# Patient Record
Sex: FEMALE | Race: WHITE | URBAN - METROPOLITAN AREA
[De-identification: names, ages, dates, MRNs, and addresses within clinical notes are randomized per-mention and may not be internally consistent; named-entity substitution may affect disease eponyms.]

---

## 2018-03-19 LAB
ABO + RH BLD: NORMAL
ABO + RH BLD: NORMAL
BLD GP AB SCN SERPL QL: NEGATIVE
C TRACH DNA SPEC QL PROBE+SIG AMP: NEGATIVE
CULTURE MICRO: NO GROWTH
HBV SURFACE AG SERPL QL IA: NEGATIVE
HCT VFR BLD AUTO: 40.3 %
HEMOGLOBIN: 13.6 G/DL (ref 11.7–15.7)
HIV 1+2 AB+HIV1 P24 AG SERPL QL IA: NORMAL
N GONORRHOEA DNA SPEC QL PROBE+SIG AMP: NEGATIVE
PLATELET # BLD AUTO: 271 10^9/L
RUBELLA ANTIBODY IGG QUANTITATIVE: NORMAL IU/ML
T PALLIDUM IGG SER QL: NORMAL

## 2018-04-17 LAB — CULTURE MICRO: NO GROWTH

## 2018-07-11 ENCOUNTER — PRENATAL OFFICE VISIT (OUTPATIENT)
Dept: OBGYN | Facility: CLINIC | Age: 28
End: 2018-07-11
Payer: COMMERCIAL

## 2018-07-11 VITALS
BODY MASS INDEX: 22.22 KG/M2 | HEIGHT: 69 IN | HEART RATE: 103 BPM | DIASTOLIC BLOOD PRESSURE: 85 MMHG | WEIGHT: 150 LBS | SYSTOLIC BLOOD PRESSURE: 125 MMHG

## 2018-07-11 DIAGNOSIS — K59.00 CONSTIPATION, UNSPECIFIED CONSTIPATION TYPE: ICD-10-CM

## 2018-07-11 DIAGNOSIS — O28.3 ABNORMAL ULTRASONIC FINDING ON ANTENATAL SCREENING OF MOTHER: ICD-10-CM

## 2018-07-11 DIAGNOSIS — Z34.02 ENCOUNTER FOR SUPERVISION OF NORMAL FIRST PREGNANCY IN SECOND TRIMESTER: Primary | ICD-10-CM

## 2018-07-11 PROBLEM — O23.40 URINARY TRACT INFECTION IN PREGNANCY, ANTEPARTUM: Status: ACTIVE | Noted: 2018-07-11

## 2018-07-11 LAB
GLUCOSE 1H P 50 G GLC PO SERPL-MCNC: 130 MG/DL (ref 60–129)
HGB BLD-MCNC: 11.2 G/DL (ref 11.7–15.7)

## 2018-07-11 PROCEDURE — 00000218 ZZHCL STATISTIC OBHBG - HEMOGLOBIN: Performed by: OBSTETRICS & GYNECOLOGY

## 2018-07-11 PROCEDURE — 99207 ZZC FIRST OB VISIT: CPT | Performed by: OBSTETRICS & GYNECOLOGY

## 2018-07-11 PROCEDURE — 36415 COLL VENOUS BLD VENIPUNCTURE: CPT | Performed by: OBSTETRICS & GYNECOLOGY

## 2018-07-11 PROCEDURE — 82950 GLUCOSE TEST: CPT | Performed by: OBSTETRICS & GYNECOLOGY

## 2018-07-11 NOTE — MR AVS SNAPSHOT
After Visit Summary   2018    Barb Levy    MRN: [id number]           Patient Information     Date Of Birth          1990        Visit Information        Provider Department      2018 3:30 PM Amee Becerra MD [facility]        Today's Diagnoses     Encounter for supervision of normal first pregnancy in second trimester    -  1    Abnormal ultrasonic finding on  screening of mother           Follow-ups after your visit        Additional Services     [facility] REFERRAL-PREGNANCY       There is no height or weight on file to calculate BMI.    >> Patient may proceed with recommendations for further testing as directed by the Maternal Fetal Medicine Specialist >>    >> If requesting Fetal Echo: MFM will determine appropriate location for exam due to indication.    >> If requesting Lung Maturity Amnio:  If results indicate fetal lung maturity, induction or C/S is recommended within 36 hours.  Please schedule accordingly.     Dear Patient:   Please be aware that coverage of these services is subject to the terms and limitations of your health insurance plan.  Call member services at your health plan with any benefit or coverage questions.      Please bring the following to your appointment:    >>  Any x-rays, CTs or MRIs which have been performed.  Contact the facility where they were done to arrange for  prior to your scheduled appointment.  Any new CT, MRI or other procedures ordered by your specialist must be performed at a [place] facility or coordinated by your clinic's referral office.  >>  List of current medications   >>  This referral request   >>  Any documents/labs given to you for this referral                  Who to contact     If you have questions or need follow up information about today's clinic visit or your schedule please contact [facility] directly at [phone].  Normal or non-critical lab and  "imaging results will be communicated to you by MyChart, letter or phone within 4 business days after the clinic has received the results. If you do not hear from us within 7 days, please contact the clinic through NPRt or phone. If you have a critical or abnormal lab result, we will notify you by phone as soon as possible.  Submit refill requests through Telensius or call your pharmacy and they will forward the refill request to us. Please allow 3 business days for your refill to be completed.          Additional Information About Your Visit        ZeusharMarketPage Information     Telensius lets you send messages to your doctor, view your test results, renew your prescriptions, schedule appointments and more. To sign up, go to [email or web address] . Click on \"Log in\" on the left side of the screen, which will take you to the Welcome page. Then click on \"Sign up Now\" on the right side of the page.     You will be asked to enter the access code listed below, as well as some personal information. Please follow the directions to create your username and password.     Your access code is: [email or web address]  Expires: 10/9/2018  3:53 PM     Your access code will  in 90 days. If you need help or a new code, please call your [place] clinic or [phone].        Care EveryWhere ID     This is your Care EveryWhere ID. This could be used by other organizations to access your [place] medical records  [id number]        Your Vitals Were     Pulse Height Last Period BMI (Body Mass Index)          103 5' 8.5\" (1.74 m) 2018 22.48 kg/m2         Blood Pressure from Last 3 Encounters:   18 125/85    Weight from Last 3 Encounters:   18 150 lb (68 kg)              We Performed the Following     ABO and Rh     Anti Treponema     CBC with platelets     Chlamydia trachomatis PCR     Glucose tolerance gest screen 1 hour     Hepatitis B surface antigen     HIV Antigen Antibody Combo     MAT FETAL MED CTR REFERRAL-PREGNANCY  "    Neisseria gonorrhoeae PCR     OB hemoglobin     OB hemoglobin     Rubella Antibody IgG Quantitative     Urine Culture Aerobic Bacterial     Urine Culture Aerobic Bacterial        Primary Care Provider Fax #    Physician No Ref-Primary [phone]       No address on file        Equal Access to Services     DEVONTE WHITTINGTON : Hadii boby starkey juliana Andino, warovertoda luqadaha, qaybta kaalmada arline, toño holden laShaeamon barba. So [facility] [phone].    ATENCIÓN: Si habla español, tiene a way disposición servicios gratuitos de asistencia lingüística. Llame al [phone].    We comply with applicable federal civil rights laws and [place] laws. We do not discriminate on the basis of race, color, national origin, age, disability, sex, sexual orientation, or gender identity.            Thank you!     Thank you for choosing [facility]  for your care. Our goal is always to provide you with excellent care. Hearing back from our patients is one way we can continue to improve our services. Please take a few minutes to complete the written survey that you may receive in the mail after your visit with us. Thank you!             Your Updated Medication List - Protect others around you: Learn how to safely use, store and throw away your medicines at www.disposemymeds.org.      Notice  As of 7/11/2018  3:53 PM    You have not been prescribed any medications.

## 2018-07-11 NOTE — Clinical Note
Please abstract the following data from this visit with this patient into the appropriate field in Epic:  Pap smear done on this date: 1/2016 (approximately), by this group: unk, results were NIL.

## 2018-07-11 NOTE — PROGRESS NOTES
OB - New OB History and Physical  Date of visit: 2018  Chief Complaint: To establish prenatal care    HPI: Barb Levy is a 27 year old  at 24w3d as dated by LMP c/w 9wUS.   Estimated Date of Delivery: Oct 28, 2018.    Transfer of care from [place] due to change in insurance coverage.  They live in [facility]    This was a planned pregnancy.  Having a baby girl.  She and  Rafael are very excited.    Ultrasound: Initial survey showed BPD and HC lagging, with growth at 10%ile.  Had f/u US with MFM that showed HC 4%ile and EFW 10%ile.  Recommended 4w f/u.    Obstetric history:     Obstetric History       T0      L0     SAB0   TAB0   Ectopic0   Multiple0   Live Births0       # Outcome Date GA Lbr Kenny/2nd Weight Sex Delivery Anes PTL Lv   1 Current                   Gynecologic History:   Menstrual Interval: 28-30 days  Patient's last menstrual period was 2018.   STI history: denies  Last Pap: 2016  History of abnormal pap: denies    Allergy: Review of patient's allergies indicates not on file.  Patient denies food, latex or environmental allergies.     Current Medications:  No current outpatient prescriptions on file.     No current facility-administered medications for this visit.        Past Medical History:  History reviewed. No pertinent past medical history.    Past Surgical History:  Past Surgical History:   Procedure Laterality Date     HC TOOTH EXTRACTION W/FORCEP      wisdom teeth       Social History:  Patient lives with  Rafael.  Patient's relationship status is: .    Denies current tobacco, alcohol or recreational drug use.    She feels safe in her relationship. Patient denies history of sexual, physical or mental abuse.     Family History:  Family History   Problem Relation Age of Onset     Melanoma Father      Colon Cancer Maternal Grandfather      HEART DISEASE Maternal Grandfather      Osteoperosis Paternal Grandmother      Bladder  "Cancer Paternal Grandfather        Review of Systems  Gen: no concerning change in weight, no fever, no chills, no fatigue  CV: no palpitations, no chest pain, no hypertension, no syncope  Resp: no shortness of breath, no cough, no wheezing, no asthma  GI: no nausea, no vomiting, no diarrhea, no constipation, no bloating, no GERD  :  no vaginal discharge, no dysuria, no abnormal bleeding, no pelvic pain   Endo: no thyroid problems, no cold/heat intolerance, no acne, no hirsutism, no diabetes  Heme: no easy bruising or bleeding, no history of DVT/PE/CVA  Neuro: no headaches, no seizures, no strokes, no focal deficits      Physical Exam:  Vitals:    18 1529   BP: 125/85   Pulse: 103   Weight: 150 lb (68 kg)   Height: 5' 8.5\" (1.74 m)     Body mass index is 22.48 kg/(m^2).  Gen: alert, oriented, no distress,  pleasant, appears stated age, appropriately groomed  Abd: soft, nontender, nondistended, normoactive bowel sounds,  no masses or organomegaly, no hernias, no scars  Extr: warm, well perfused, nontender, no edema  Psych: affect bright, cooperative, responds appropriately      Assessment:  Barb Levy is a 27 year old  at 24w3d presenting to establish prenatal care.    Problem List:   Lagging HC, growth  Constipation      Plan:  1. Lagging HC, growth: recommend repeat US with MFM order placed.  2. Constipation:  Recommended Colace BID and Miralax daily prn  3. Reviewed routine prenatal care. Discussed MD call schedule as well as role of residents and med students both in clinic and hospital.  She is okay  with resident care  4. Pap: up to date   5. Diet, Nutrition and Exercise:  Continue PNVs. Continue normal exercise. Her prepregnancy BMI is 20.  According to the WHO guidelines, patient is given a goal of gaining approximately 25-35 pounds during the course of her pregnancy.    6. Immunizations: plan TdaP at 28 weeks  7. Fetal anomaly screening: n/a  8. Routine Prenatal Care: the patient will " return to clinic in 4 weeks and prn    Amee Becerra MD

## 2018-07-13 ENCOUNTER — TRANSFERRED RECORDS (OUTPATIENT)
Dept: HEALTH INFORMATION MANAGEMENT | Facility: CLINIC | Age: 28
End: 2018-07-13

## 2018-07-16 ENCOUNTER — PRE VISIT (OUTPATIENT)
Dept: MATERNAL FETAL MEDICINE | Facility: CLINIC | Age: 28
End: 2018-07-16

## 2018-07-16 DIAGNOSIS — Z34.02 ENCOUNTER FOR SUPERVISION OF NORMAL FIRST PREGNANCY IN SECOND TRIMESTER: ICD-10-CM

## 2018-07-16 LAB
GLUCOSE 1H P 100 G GLC PO SERPL-MCNC: 104 MG/DL (ref 60–179)
GLUCOSE 2H P 100 G GLC PO SERPL-MCNC: 80 MG/DL (ref 60–154)
GLUCOSE 3H P 100 G GLC PO SERPL-MCNC: 48 MG/DL (ref 60–139)
GLUCOSE P FAST SERPL-MCNC: 71 MG/DL (ref 60–94)

## 2018-07-16 PROCEDURE — 82951 GLUCOSE TOLERANCE TEST (GTT): CPT | Performed by: OBSTETRICS & GYNECOLOGY

## 2018-07-16 PROCEDURE — 36415 COLL VENOUS BLD VENIPUNCTURE: CPT | Performed by: OBSTETRICS & GYNECOLOGY

## 2018-07-16 PROCEDURE — 82952 GTT-ADDED SAMPLES: CPT | Performed by: OBSTETRICS & GYNECOLOGY

## 2018-07-23 ENCOUNTER — HOSPITAL ENCOUNTER (OUTPATIENT)
Dept: ULTRASOUND IMAGING | Facility: CLINIC | Age: 28
Discharge: HOME | End: 2018-07-23
Attending: OBSTETRICS & GYNECOLOGY | Admitting: OBSTETRICS & GYNECOLOGY
Payer: COMMERCIAL

## 2018-07-23 ENCOUNTER — OFFICE VISIT (OUTPATIENT)
Dept: MATERNAL FETAL MEDICINE | Facility: CLINIC | Age: 28
End: 2018-07-23
Attending: OBSTETRICS & GYNECOLOGY
Payer: COMMERCIAL

## 2018-07-23 DIAGNOSIS — O26.90 PREGNANCY RELATED CONDITION, UNSPECIFIED TRIMESTER: ICD-10-CM

## 2018-07-23 DIAGNOSIS — Z03.74 SUSPECTED PROBLEM WITH FETAL GROWTH NOT FOUND: Primary | ICD-10-CM

## 2018-07-23 PROCEDURE — 76811 OB US DETAILED SNGL FETUS: CPT

## 2018-07-23 NOTE — MR AVS SNAPSHOT
After Visit Summary   7/23/2018    Barb Levy    MRN: [id number]           Patient Information     Date Of Birth          1990        Visit Information        Provider Department      7/23/2018 11:30 AM Amee Hardwick MD [facility]        Today's Diagnoses     Suspected problem with fetal growth not found    -  1       Follow-ups after your visit        Your next 10 appointments already scheduled     Aug 07, 2018  3:45 PM CDT   ESTABLISHED PRENATAL with [facility]erra [facility]    [address] [place] [id number] [phone]              Who to contact     If you have questions or need follow up information about today's clinic visit or your schedule please contact [facility] [facility] directly at [phone].  Normal or non-critical lab and imaging results will be communicated to you by Nabi Biopharmaceuticalshart, letter or phone within 4 business days after the clinic has received the results. If you do not hear from us within 7 days, please contact the clinic through N[employer]oph[employer]art or phone. If you have a critical or abnormal lab result, we will notify you by phone as soon as possible.  Submit refill requests through TV TubeX or call your pharmacy and they will forward the refill request to us. Please allow 3 business days for your refill to be completed.          Additional Information About Your Visit        MyChart Information     TV TubeX gives you secure access to your electronic health record. If you see a primary care provider, you can also send messages to your care team and make appointments. If you have questions, please call your primary care clinic.  If you do not have a primary care provider, please call [phone] and they will assist you.        Care EveryWhere ID     This is your Care EveryWhere ID. This could be used by other organizations to  access your [place] medical records  [id number]        Your Vitals Were     Last Period                   01/21/2018            Blood Pressure from Last 3 Encounters:   07/11/18 125/85    Weight from Last 3 Encounters:   07/11/18 68 kg (150 lb)              Today, you had the following     No orders found for display       Primary Care Provider Fax #    Physician No Ref-Primary [phone]       No address on file        Equal Access to Services     VU NELSY : Hadii aad ku hadasho Soomaali, waaxda luqadaha, qaybta kaalmada adeegyada, waxay idiin haynaiman adeeg navin laShaaan . So [facility] [phone].    ATENCIÓN: Si habla español, tiene a way disposición servicios gratuitos de asistencia lingüística. Llame al [phone].    We comply with applicable federal civil rights laws and [place] laws. We do not discriminate on the basis of race, color, national origin, age, disability, sex, sexual orientation, or gender identity.            Thank you!     Thank you for choosing [facility]  for your care. Our goal is always to provide you with excellent care. Hearing back from our patients is one way we can continue to improve our services. Please take a few minutes to complete the written survey that you may receive in the mail after your visit with us. Thank you!             Your Updated Medication List - Protect others around you: Learn how to safely use, store and throw away your medicines at www.disposemymeds.org.      Notice  As of 7/23/2018 12:38 PM    You have not been prescribed any medications.

## 2018-07-23 NOTE — PROGRESS NOTES
Please see ultrasound report under imaging tab for details on ultrasound performed today.    Amee Hardwick MD  , OB/GYN  Maternal-Fetal Medicine  [email or web address]  [phone] (Academic office)  [phone] (Pager)

## 2018-08-07 ENCOUNTER — PRENATAL OFFICE VISIT (OUTPATIENT)
Dept: OBGYN | Facility: CLINIC | Age: 28
End: 2018-08-07
Payer: COMMERCIAL

## 2018-08-07 VITALS
DIASTOLIC BLOOD PRESSURE: 71 MMHG | WEIGHT: 154.2 LBS | TEMPERATURE: 98.4 F | SYSTOLIC BLOOD PRESSURE: 104 MMHG | BODY MASS INDEX: 23.11 KG/M2 | HEART RATE: 77 BPM

## 2018-08-07 DIAGNOSIS — Z23 NEED FOR TDAP VACCINATION: ICD-10-CM

## 2018-08-07 DIAGNOSIS — Z34.00 SUPERVISION OF NORMAL FIRST PREGNANCY, ANTEPARTUM: Primary | ICD-10-CM

## 2018-08-07 PROCEDURE — 99207 ZZC PRENATAL VISIT: CPT | Performed by: OBSTETRICS & GYNECOLOGY

## 2018-08-07 PROCEDURE — 90471 IMMUNIZATION ADMIN: CPT | Performed by: OBSTETRICS & GYNECOLOGY

## 2018-08-07 PROCEDURE — 90715 TDAP VACCINE 7 YRS/> IM: CPT | Performed by: OBSTETRICS & GYNECOLOGY

## 2018-08-07 NOTE — MR AVS SNAPSHOT
After Visit Summary   8/7/2018    Barb Levy    MRN: [id number]           Patient Information     Date Of Birth          1990        Visit Information        Provider Department      8/7/2018 3:45 PM Amee Becerra MD [facility]        Today's Diagnoses     Supervision of normal first pregnancy, antepartum    -  1    Need for Tdap vaccination           Follow-ups after your visit        Your next 10 appointments already scheduled     Sep 04, 2018  3:45 PM CDT   ESTABLISHED PRENATAL with Amee Becerra MD   [facility]    [address] [place] [id number] [phone]              Who to contact     If you have questions or need follow up information about today's clinic visit or your schedule please contact [facility] directly at [phone].  Normal or non-critical lab and imaging results will be communicated to you by MyChart, letter or phone within 4 business days after the clinic has received the results. If you do not hear from us within 7 days, please contact the clinic through SEDEM[employer] Mechatron[employer]hart or phone. If you have a critical or abnormal lab result, we will notify you by phone as soon as possible.  Submit refill requests through LiftMetrix or call your pharmacy and they will forward the refill request to us. Please allow 3 business days for your refill to be completed.          Additional Information About Your Visit        MyChart Information     LiftMetrix gives you secure access to your electronic health record. If you see a primary care provider, you can also send messages to your care team and make appointments. If you have questions, please call your primary care clinic.  If you do not have a primary care provider, please call [phone] and they will assist you.        Care EveryWhere ID     This is your Care EveryWhere ID. This could be used by other organizations to  access your [place] medical records  [id number]        Your Vitals Were     Pulse Temperature Last Period BMI (Body Mass Index)          77 98.4  F (36.9  C) (Oral) 01/21/2018 23.11 kg/m2         Blood Pressure from Last 3 Encounters:   08/07/18 104/71   07/11/18 125/85    Weight from Last 3 Encounters:   08/07/18 154 lb 3.2 oz (69.9 kg)   07/11/18 150 lb (68 kg)              We Performed the Following     TDAP VACCINE (BOOSTRIX)     VACCINE ADMINISTRATION, INITIAL        Primary Care Provider Fax #    Physician No Ref-Primary [phone]       No address on file        Equal Access to Services     VU WHITTINGTON : Lencho Andino, mikki ivan, ángel nunn, toño keen . So [facility] [phone].    ATENCIÓN: Si habla español, tiene a way disposición servicios gratuitos de asistencia lingüística. Llame al [phone].    We comply with applicable federal civil rights laws and [place] laws. We do not discriminate on the basis of race, color, national origin, age, disability, sex, sexual orientation, or gender identity.            Thank you!     Thank you for choosing [facility]  for your care. Our goal is always to provide you with excellent care. Hearing back from our patients is one way we can continue to improve our services. Please take a few minutes to complete the written survey that you may receive in the mail after your visit with us. Thank you!             Your Updated Medication List - Protect others around you: Learn how to safely use, store and throw away your medicines at www.disposemymeds.org.      Notice  As of 8/7/2018 11:59 PM    You have not been prescribed any medications.

## 2018-08-08 PROBLEM — Z34.00 SUPERVISION OF NORMAL FIRST PREGNANCY, ANTEPARTUM: Status: ACTIVE | Noted: 2018-08-08

## 2018-08-08 NOTE — PROGRESS NOTES
28w2d  Doing very well since last visit.  Had 3hr GTT, which she passed.  All values normal (3hr actually flagged as low).    Had MFM scan due to concern for small HC on outside ultrasound, but imaging here was normal.  Follow-up as CI.  Given information on hospital tours, breastfeeding, etc.  S/p Tdap today.  RTC 4w, then q2w until 36w.  Amee Becerra MD

## 2018-08-24 ENCOUNTER — TELEPHONE (OUTPATIENT)
Dept: OBGYN | Facility: CLINIC | Age: 28
End: 2018-08-24

## 2018-08-24 NOTE — TELEPHONE ENCOUNTER
"Pt called and stated that she has been having blood in her bowel movements since Monday 8/20. Is concerned because amount of blood has increased (to about the size of a light period. States she has \"pretty bad\" hemorrhoids throughout her pregnancy and feels it is related to that    Pt sure that blood is coming out of her rectum, not vagina. Denies any uterine pain, cramping, or straining during bowel movements.    Pt is taking colace, prune juice and drinking lots of water. States hemorrhoids are not painful.     Advised pt that hemorrhoids can bleed when irritated. Will fwd to provider for recommendations or if pt should be seen    Amee Lutz RN    "

## 2018-08-25 NOTE — TELEPHONE ENCOUNTER
Call returned to patient.  Message left for patient to try anusol rectal suppositories and call back if no improvement. RR

## 2018-09-04 ENCOUNTER — PRENATAL OFFICE VISIT (OUTPATIENT)
Dept: OBGYN | Facility: CLINIC | Age: 28
End: 2018-09-04
Payer: COMMERCIAL

## 2018-09-04 VITALS
DIASTOLIC BLOOD PRESSURE: 67 MMHG | WEIGHT: 156 LBS | BODY MASS INDEX: 23.37 KG/M2 | TEMPERATURE: 98.3 F | SYSTOLIC BLOOD PRESSURE: 112 MMHG | HEART RATE: 95 BPM

## 2018-09-04 DIAGNOSIS — Z34.00 SUPERVISION OF NORMAL FIRST PREGNANCY, ANTEPARTUM: ICD-10-CM

## 2018-09-04 PROCEDURE — 99207 ZZC PRENATAL VISIT: CPT | Performed by: OBSTETRICS & GYNECOLOGY

## 2018-09-04 NOTE — PROGRESS NOTES
32w2d  Called with rectal bleeding from hemorrhoids since last visit, but things have improved.  Using rectal anusol suppository, so no further episodes of bleeding, but it was pretty heavy at the time.  Has had pretty terrible constipation this pregnancy.  Takes Colace, fiber pills, and prune juice daily.  Also has MiraLAX that she is taking about 3 times this pregnancy.  Recommended Colace twice daily as well as MiraLAX daily as needed.  Important to keep stools soft, regular.  No cramping, vaginal bleeding, or leaking fluid.  Good fetal movement.  Given information on breast-feeding.  Has hospital tour scheduled tonight.  RTC 2w  Amee Becerra MD

## 2018-09-04 NOTE — MR AVS SNAPSHOT
After Visit Summary   9/4/2018    Barb Levy    MRN: [id number]           Patient Information     Date Of Birth          1990        Visit Information        Provider Department      9/4/2018 3:45 PM Amee Becerra MD [facility]        Today's Diagnoses     Supervision of normal first pregnancy, antepartum           Follow-ups after your visit        Your next 10 appointments already scheduled     Sep 20, 2018  4:00 PM CDT   ESTABLISHED PRENATAL with Amee Becerra MD   [facility] [place]    [address] [place] [address] [address]  [facility] [address]   [phone]            Oct 02, 2018  3:45 PM CDT   ESTABLISHED PRENATAL with Amee Becerra MD   [facility] [place]    [address] [place] [address] 700  [facility] [address] [phone]            Oct 09, 2018  3:45 PM CDT   ESTABLISHED PRENATAL with Amee Becerra MD   [facility] [place]    [address] [place] [address] 700  [facility] [address] [phone]            Oct 16, 2018  3:45 PM CDT   ESTABLISHED PRENATAL with Amee Becerra MD   [facility] [place]    [address] [place] [address] 700  [facility] [address] [phone]            Oct 24, 2018  3:45 PM CDT   ESTABLISHED PRENATAL with Amee Becerra MD   [facility] [place]    [address] [place] [address] 700  [facility] [address] [phone]              Who to contact     If you have questions or need follow up information about today's clinic visit or your schedule please contact [facility] directly at [phone].  Normal or non-critical lab and imaging results will be communicated to you by MyChart, letter or phone within 4 business days after the clinic has received the results. If you do not hear  from us within 7 days, please contact the clinic through The Fan Machine or phone. If you have a critical or abnormal lab result, we will notify you by phone as soon as possible.  Submit refill requests through The Fan Machine or call your pharmacy and they will forward the refill request to us. Please allow 3 business days for your refill to be completed.          Additional Information About Your Visit        [employer]ios Information     The Fan Machine gives you secure access to your electronic health record. If you see a primary care provider, you can also send messages to your care team and make appointments. If you have questions, please call your primary care clinic.  If you do not have a primary care provider, please call [phone] and they will assist you.        Care EveryWhere ID     This is your Care EveryWhere ID. This could be used by other organizations to access your [place] medical records  [id number]        Your Vitals Were     Pulse Temperature Last Period BMI (Body Mass Index)          95 98.3  F (36.8  C) (Oral) 01/21/2018 23.37 kg/m2         Blood Pressure from Last 3 Encounters:   09/04/18 112/67   08/07/18 104/71   07/11/18 125/85    Weight from Last 3 Encounters:   09/04/18 156 lb (70.8 kg)   08/07/18 154 lb 3.2 oz (69.9 kg)   07/11/18 150 lb (68 kg)              Today, you had the following     No orders found for display       Primary Care Provider Fax #    Physician No Ref-Primary [phone]       No address on file        Equal Access to Services     DEVONTE WHITTINGTON : Hadii boby billo Sotien, waaxda luqadaha, qaybta kaalmada adeegyazenaida, toño keen . So [facility] [phone].    ATENCIÓN: Si habla español, tiene a way disposición servicios gratuitos de asistencia lingüística. Llame al [phone].    We comply with applicable federal civil rights laws and [place] laws. We do not discriminate on the basis of race, color, national origin, age, disability, sex, sexual orientation,  or gender identity.            Thank you!     Thank you for choosing [facility]  for your care. Our goal is always to provide you with excellent care. Hearing back from our patients is one way we can continue to improve our services. Please take a few minutes to complete the written survey that you may receive in the mail after your visit with us. Thank you!             Your Updated Medication List - Protect others around you: Learn how to safely use, store and throw away your medicines at www.disposemymeds.org.      Notice  As of 9/4/2018  4:25 PM    You have not been prescribed any medications.

## 2018-09-18 ENCOUNTER — HOSPITAL ENCOUNTER (OUTPATIENT)
Facility: CLINIC | Age: 28
End: 2018-09-18
Admitting: OBSTETRICS & GYNECOLOGY
Payer: COMMERCIAL

## 2018-09-20 ENCOUNTER — PRENATAL OFFICE VISIT (OUTPATIENT)
Dept: OBGYN | Facility: CLINIC | Age: 28
End: 2018-09-20
Payer: COMMERCIAL

## 2018-09-20 VITALS
HEART RATE: 82 BPM | TEMPERATURE: 97.3 F | DIASTOLIC BLOOD PRESSURE: 64 MMHG | BODY MASS INDEX: 24.12 KG/M2 | SYSTOLIC BLOOD PRESSURE: 107 MMHG | WEIGHT: 161 LBS

## 2018-09-20 DIAGNOSIS — Z34.00 SUPERVISION OF NORMAL FIRST PREGNANCY, ANTEPARTUM: Primary | ICD-10-CM

## 2018-09-20 PROCEDURE — 99207 ZZC PRENATAL VISIT: CPT | Performed by: OBSTETRICS & GYNECOLOGY

## 2018-09-20 NOTE — MR AVS SNAPSHOT
After Visit Summary   9/20/2018    Barb Levy    MRN: [id number]           Patient Information     Date Of Birth          1990        Visit Information        Provider Department      9/20/2018 4:00 PM Amee Becerra MD [facility]        Today's Diagnoses     Supervision of normal first pregnancy, antepartum    -  1       Follow-ups after your visit        Your next 10 appointments already scheduled     Oct 02, 2018  3:45 PM CDT   ESTABLISHED PRENATAL with Amee Becerra MD   [facility] ([facility])    [address] [place] [address] 700  [facility] [address]   [phone]            Oct 09, 2018  3:45 PM CDT   ESTABLISHED PRENATAL with Amee Becerra MD   [facility] ([facility])    [address] [place] [address] 700  [facility] [address]   [phone]            Oct 16, 2018  3:45 PM CDT   ESTABLISHED PRENATAL with Amee Becerra MD   [facility] ([facility]    [address] [place] [address] 700  [facility] [address] [phone]            Oct 24, 2018  3:45 PM CDT   ESTABLISHED PRENATAL with Amee Becerra MD   [facility] ([facility])    [address] [place] [address] [phone]              Who to contact     If you have questions or need follow up information about today's clinic visit or your schedule please contact [facility] directly at [phone].  Normal or non-critical lab and imaging results will be communicated to you by MyChart, letter or phone within 4 business days after the clinic has received the results. If you do not hear from us within 7 days, please contact the clinic through MyChart or phone. If you have a critical or abnormal lab result, we will notify you by phone as soon as possible.  Submit refill requests through Trackhart or call  your pharmacy and they will forward the refill request to us. Please allow 3 business days for your refill to be completed.          Additional Information About Your Visit        Vital Systemshart Information     Wildfangt gives you secure access to your electronic health record. If you see a primary care provider, you can also send messages to your care team and make appointments. If you have questions, please call your primary care clinic.  If you do not have a primary care provider, please call [phone] and they will assist you.        Care EveryWhere ID     This is your Care EveryWhere ID. This could be used by other organizations to access your [place] medical records  [id number]        Your Vitals Were     Pulse Temperature Last Period BMI (Body Mass Index)          82 97.3  F (36.3  C) (Oral) 01/21/2018 24.12 kg/m2         Blood Pressure from Last 3 Encounters:   09/20/18 107/64   09/04/18 112/67   08/07/18 104/71    Weight from Last 3 Encounters:   09/20/18 161 lb (73 kg)   09/04/18 156 lb (70.8 kg)   08/07/18 154 lb 3.2 oz (69.9 kg)              Today, you had the following     No orders found for display       Primary Care Provider Fax #    Physician No Ref-Primary [phone]       No address on file        Equal Access to Services     DEVONTE WHITTINGTON : Hadii boby ku hadasho Somillieali, waaxda luqadaha, qaybta kaalmada adeegyada, toño keen . So [facility] [phone].    ATENCIÓN: Si habla español, tiene a way disposición servicios gratuitos de asistencia lingüística. Llame al [phone].    We comply with applicable federal civil rights laws and [place] laws. We do not discriminate on the basis of race, color, national origin, age, disability, sex, sexual orientation, or gender identity.            Thank you!     Thank you for choosing [facility]  for your care. Our goal is always to provide you with excellent care. Hearing back from our patients is one way we can  continue to improve our services. Please take a few minutes to complete the written survey that you may receive in the mail after your visit with us. Thank you!             Your Updated Medication List - Protect others around you: Learn how to safely use, store and throw away your medicines at www.disposemymeds.org.          This list is accurate as of 9/20/18  5:31 PM.  Always use your most recent med list.                   Brand Name Dispense Instructions for use Diagnosis    COLACE PO

## 2018-09-20 NOTE — PROGRESS NOTES
34w4d  Doing well.  Constipation somewhat better, but still only goes every 3-4 days.  Hemorrhoids are stable; no significant bleeding since last visit.  Has only taken Miralax twice since last appointment.  Advised that constipation often worsens following delivery; may want to consider taking Miralax more regularly, every 1-2 days; hold for loose stools.  No contractions, vaginal bleeding or leakage of fluid. + fetal movement.  Some hip pain.  Feels better while wearing belly band, but comes back when she takes it off.  Slept in recliner last night, which really helped.  RTC 2w.  Discussed GBS at that time.  Amee Becerra MD

## 2018-10-02 ENCOUNTER — PRENATAL OFFICE VISIT (OUTPATIENT)
Dept: OBGYN | Facility: CLINIC | Age: 28
End: 2018-10-02
Payer: COMMERCIAL

## 2018-10-02 VITALS
BODY MASS INDEX: 24.27 KG/M2 | WEIGHT: 162 LBS | SYSTOLIC BLOOD PRESSURE: 123 MMHG | TEMPERATURE: 98 F | DIASTOLIC BLOOD PRESSURE: 82 MMHG | HEART RATE: 98 BPM

## 2018-10-02 DIAGNOSIS — Z34.00 SUPERVISION OF NORMAL FIRST PREGNANCY, ANTEPARTUM: Primary | ICD-10-CM

## 2018-10-02 DIAGNOSIS — Z23 NEED FOR PROPHYLACTIC VACCINATION AND INOCULATION AGAINST INFLUENZA: ICD-10-CM

## 2018-10-02 LAB — HGB BLD-MCNC: 12.1 G/DL (ref 11.7–15.7)

## 2018-10-02 PROCEDURE — 99207 ZZC PRENATAL VISIT: CPT | Performed by: OBSTETRICS & GYNECOLOGY

## 2018-10-02 PROCEDURE — 36416 COLLJ CAPILLARY BLOOD SPEC: CPT | Performed by: OBSTETRICS & GYNECOLOGY

## 2018-10-02 PROCEDURE — 87653 STREP B DNA AMP PROBE: CPT | Performed by: OBSTETRICS & GYNECOLOGY

## 2018-10-02 PROCEDURE — 90471 IMMUNIZATION ADMIN: CPT | Performed by: OBSTETRICS & GYNECOLOGY

## 2018-10-02 PROCEDURE — 00000218 ZZHCL STATISTIC OBHBG - HEMOGLOBIN: Performed by: OBSTETRICS & GYNECOLOGY

## 2018-10-02 PROCEDURE — 90686 IIV4 VACC NO PRSV 0.5 ML IM: CPT | Performed by: OBSTETRICS & GYNECOLOGY

## 2018-10-02 NOTE — PROGRESS NOTES
36w2d  Doing well.  No contractions, vaginal bleeding or leakage of fluid.  + fetal movement.    Ran out of colace and Miralax, but stools have been more normal since stopping meds.  Hemorrhoids are still there, but not real bothersome at this point.    Breast pump rx: script done   Birth plan: open to epidural  Length of stay: discussed  Disability paperwork: will bring to next visit  Resident involvement: discussed and agrees.    GBS and hgb today.  RTC weekly until delivery  Amee Becerra MD

## 2018-10-02 NOTE — MR AVS SNAPSHOT
After Visit Summary   10/2/2018    Barb Levy    MRN: [id number]           Patient Information     Date Of Birth          1990        Visit Information        Provider Department      10/2/2018 3:45 PM Amee Becerra MD [facility]        Today's Diagnoses     Supervision of normal first pregnancy, antepartum    -  1       Follow-ups after your visit        Your next 10 appointments already scheduled     Oct 09, 2018  3:45 PM CDT   ESTABLISHED PRENATAL with Amee Becerra MD   [facility] [place]    [address] [place] [address] [phone]            Oct 16, 2018  3:45 PM CDT   ESTABLISHED PRENATAL with Amee Becerra MD   [facility] [place]    [address] [place] [address] [phone]            Oct 24, 2018  3:45 PM CDT   ESTABLISHED PRENATAL with Amee Becerra MD   [facility] ([facility]    [address] [place] [address] [phone]              Who to contact     If you have questions or need follow up information about today's clinic visit or your schedule please contact [facility] directly at [phone].  Normal or non-critical lab and imaging results will be communicated to you by MyChart, letter or phone within 4 business days after the clinic has received the results. If you do not hear from us within 7 days, please contact the clinic through MyChart or phone. If you have a critical or abnormal lab result, we will notify you by phone as soon as possible.  Submit refill requests through Gateshop or call your pharmacy and they will forward the refill request to us. Please allow 3 business days for your refill to be completed.          Additional Information About Your Visit        MyChart Information     [facility] gives  you secure access to your electronic health record. If you see a primary care provider, you can also send messages to your care team and make appointments. If you have questions, please call your primary care clinic.  If you do not have a primary care provider, please call [phone] and they will assist you.        Care EveryWhere ID     This is your Care EveryWhere ID. This could be used by other organizations to access your [place] medical records  [id number]        Your Vitals Were     Pulse Temperature Last Period Breastfeeding? BMI (Body Mass Index)       98 98  F (36.7  C) (Oral) 01/21/2018 No 24.27 kg/m2        Blood Pressure from Last 3 Encounters:   10/02/18 123/82   09/20/18 107/64   09/04/18 112/67    Weight from Last 3 Encounters:   10/02/18 162 lb (73.5 kg)   09/20/18 161 lb (73 kg)   09/04/18 156 lb (70.8 kg)              We Performed the Following     Group B strep PCR     OB hemoglobin          Today's Medication Changes          These changes are accurate as of 10/2/18  4:12 PM.  If you have any questions, ask your nurse or doctor.               Start taking these medicines.        Dose/Directions    order for DME   Used for:  Supervision of normal first pregnancy, antepartum   Started by:  Amee Becerra MD        Equipment being ordered: double electric breast pump   Quantity:  1 pump   Refills:  0            Where to get your medicines      Some of these will need a paper prescription and others can be bought over the counter.  Ask your nurse if you have questions.     Bring a paper prescription for each of these medications     order for DME                Primary Care Provider Fax #    Physician No Ref-Primary [phone]       No address on file        Equal Access to Services     [facility]: Lencho Andino, wajefe ivan, qaybta toño vaughn. So [facility] [phone].    ATENCIÓN: deborah Mabry  disposición servicios gratuitos de asistencia lingüística. Chase lynn [phone].    We comply with applicable federal civil rights laws and [place] laws. We do not discriminate on the basis of race, color, national origin, age, disability, sex, sexual orientation, or gender identity.            Thank you!     Thank you for choosing [facility]  for your care. Our goal is always to provide you with excellent care. Hearing back from our patients is one way we can continue to improve our services. Please take a few minutes to complete the written survey that you may receive in the mail after your visit with us. Thank you!             Your Updated Medication List - Protect others around you: Learn how to safely use, store and throw away your medicines at www.disposemymeds.org.          This list is accurate as of 10/2/18  4:12 PM.  Always use your most recent med list.                   Brand Name Dispense Instructions for use Diagnosis    COLACE PO           order for DME     1 pump    Equipment being ordered: double electric breast pump    Supervision of normal first pregnancy, antepartum

## 2018-10-04 LAB
GP B STREP DNA SPEC QL NAA+PROBE: NEGATIVE
SPECIMEN SOURCE: NORMAL

## 2018-10-09 ENCOUNTER — PRENATAL OFFICE VISIT (OUTPATIENT)
Dept: OBGYN | Facility: CLINIC | Age: 28
End: 2018-10-09
Payer: COMMERCIAL

## 2018-10-09 VITALS
SYSTOLIC BLOOD PRESSURE: 111 MMHG | BODY MASS INDEX: 24.12 KG/M2 | DIASTOLIC BLOOD PRESSURE: 69 MMHG | TEMPERATURE: 98.2 F | WEIGHT: 161 LBS | HEART RATE: 84 BPM

## 2018-10-09 DIAGNOSIS — O26.893 VAGINAL DISCHARGE DURING PREGNANCY IN THIRD TRIMESTER: ICD-10-CM

## 2018-10-09 DIAGNOSIS — N89.8 VAGINAL DISCHARGE DURING PREGNANCY IN THIRD TRIMESTER: ICD-10-CM

## 2018-10-09 DIAGNOSIS — Z34.00 SUPERVISION OF NORMAL FIRST PREGNANCY, ANTEPARTUM: Primary | ICD-10-CM

## 2018-10-09 LAB — RUPTURE OF FETAL MEMBRANES BY ROM PLUS: NEGATIVE

## 2018-10-09 PROCEDURE — 99207 ZZC PRENATAL VISIT: CPT | Performed by: OBSTETRICS & GYNECOLOGY

## 2018-10-09 PROCEDURE — 84112 EVAL AMNIOTIC FLUID PROTEIN: CPT | Performed by: OBSTETRICS & GYNECOLOGY

## 2018-10-09 NOTE — MR AVS SNAPSHOT
After Visit Summary   10/9/2018    Barb Levy    MRN: [id number]           Patient Information     Date Of Birth          1990        Visit Information        Provider Department      10/9/2018 3:45 PM Amee Becerra MD [facility] [place]        Today's Diagnoses     Supervision of normal first pregnancy, antepartum    -  1    Vaginal discharge during pregnancy in third trimester           Follow-ups after your visit        Your next 10 appointments already scheduled     Oct 16, 2018  3:45 PM CDT   ESTABLISHED PRENATAL with Amee Becerra MD   [facility] [place] [facility] ([facility]    [address] [place] [address] [phone]            Oct 24, 2018  3:45 PM CDT   ESTABLISHED PRENATAL with Amee Becerra MD   [facility] ([facility]    [address] [place] [address] [phone]              Who to contact     If you have questions or need follow up information about today's clinic visit or your schedule please contact [facility] directly at [phone].  Normal or non-critical lab and imaging results will be communicated to you by TourPalhart, letter or phone within 4 business days after the clinic has received the results. If you do not hear from us within 7 days, please contact the clinic through TourPalhart or phone. If you have a critical or abnormal lab result, we will notify you by phone as soon as possible.  Submit refill requests through StormWind or call your pharmacy and they will forward the refill request to us. Please allow 3 business days for your refill to be completed.          Additional Information About Your Visit        MyChart Information     StormWind gives you secure access to your electronic health record. If you see a primary care provider, you can also send messages to your care team and make appointments. If you  have questions, please call your primary care clinic.  If you do not have a primary care provider, please call [phone] and they will assist you.        Care EveryWhere ID     This is your Care EveryWhere ID. This could be used by other organizations to access your [place] medical records  [id number]        Your Vitals Were     Pulse Temperature Last Period BMI (Body Mass Index)          84 98.2  F (36.8  C) (Oral) 01/21/2018 24.12 kg/m2         Blood Pressure from Last 3 Encounters:   10/09/18 111/69   10/02/18 123/82   09/20/18 107/64    Weight from Last 3 Encounters:   10/09/18 161 lb (73 kg)   10/02/18 162 lb (73.5 kg)   09/20/18 161 lb (73 kg)              We Performed the Following     Rupture of Fetal Membranes by ROM Plus        Primary Care Provider Fax #    Physician No Ref-Primary [phone]       No address on file        Equal Access to Services     DEVONTE WHITTINGTON : Lencho Andino, mikki ivan, ángel kaalmazenaida nunn, toño keen . So [facility] [phone].    ATENCIÓN: Si habla español, tiene a way disposición servicios gratuitos de asistencia lingüística. Llame al [phone].    We comply with applicable federal civil rights laws and [place] laws. We do not discriminate on the basis of race, color, national origin, age, disability, sex, sexual orientation, or gender identity.            Thank you!     Thank you for choosing [facility]  for your care. Our goal is always to provide you with excellent care. Hearing back from our patients is one way we can continue to improve our services. Please take a few minutes to complete the written survey that you may receive in the mail after your visit with us. Thank you!             Your Updated Medication List - Protect others around you: Learn how to safely use, store and throw away your medicines at www.disposemymeds.org.          This list is accurate as of 10/9/18  5:16 PM.  Always use  your most recent med list.                   Brand Name Dispense Instructions for use Diagnosis    COLACE PO           order for DME     1 pump    Equipment being ordered: double electric breast pump    Supervision of normal first pregnancy, antepartum

## 2018-10-09 NOTE — PROGRESS NOTES
37w2d  Has had thin watery discharge since Sunday.  No vaginal bleeding or leakage of fluid.  + fetal movement.  Did have intercourse last night.  No fluid on perineum.  ROM + obtained and sent to lab.  Results negative  RTC weekly until delivery  Amee Becerra MD

## 2018-10-16 ENCOUNTER — PRENATAL OFFICE VISIT (OUTPATIENT)
Dept: OBGYN | Facility: CLINIC | Age: 28
End: 2018-10-16
Payer: COMMERCIAL

## 2018-10-16 VITALS
BODY MASS INDEX: 24.72 KG/M2 | WEIGHT: 165 LBS | HEART RATE: 80 BPM | TEMPERATURE: 96.8 F | SYSTOLIC BLOOD PRESSURE: 112 MMHG | DIASTOLIC BLOOD PRESSURE: 70 MMHG

## 2018-10-16 DIAGNOSIS — Z34.00 SUPERVISION OF NORMAL FIRST PREGNANCY, ANTEPARTUM: Primary | ICD-10-CM

## 2018-10-16 PROCEDURE — 99207 ZZC PRENATAL VISIT: CPT | Performed by: OBSTETRICS & GYNECOLOGY

## 2018-10-16 NOTE — PROGRESS NOTES
38w2d  Doing well.  Occasional mild contraction, nothing particularly uncomfortable.  No vaginal bleeding or leaking fluid.  Normal fetal movement.  Feeling ready for delivery.  No particular questions or concerns today.  RTC weekly until delivery  Amee Becerra MD

## 2018-10-16 NOTE — MR AVS SNAPSHOT
After Visit Summary   10/16/2018    Barb Levy    MRN: [id number]           Patient Information     Date Of Birth          1990        Visit Information        Provider Department      10/16/2018 3:45 PM Amee Becerra MD [facility]        Today's Diagnoses     Supervision of normal first pregnancy, antepartum    -  1       Follow-ups after your visit        Your next 10 appointments already scheduled     Oct 24, 2018  3:45 PM CDT   ESTABLISHED PRENATAL with Amee Becerra MD   [facility] ([facility])    [address] [place] [id number] [phone]            Oct 31, 2018  4:30 PM CDT   ESTABLISHED PRENATAL with Amee Becerra MD   [facility] ([facility])    [address] [place] [id number] [phone]              Who to contact     If you have questions or need follow up information about today's clinic visit or your schedule please contact [facility] directly at [phone].  Normal or non-critical lab and imaging results will be communicated to you by Sparxenthart, letter or phone within 4 business days after the clinic has received the results. If you do not hear from us within 7 days, please contact the clinic through [employer] or phone. If you have a critical or abnormal lab result, we will notify you by phone as soon as possible.  Submit refill requests through Corceuticals or call your pharmacy and they will forward the refill request to us. Please allow 3 business days for your refill to be completed.          Additional Information About Your Visit        Sparxenthart Information     Corceuticals gives you secure access to your electronic health record. If you see a primary care provider, you can also send messages to your care team and make appointments. If you have questions, please call your primary care clinic.  If you  do not have a primary care provider, please call [phone] and they will assist you.        Care EveryWhere ID     This is your Care EveryWhere ID. This could be used by other organizations to access your [place] medical records  [id number]        Your Vitals Were     Pulse Temperature Last Period BMI (Body Mass Index)          80 96.8  F (36  C) (Oral) 01/21/2018 24.72 kg/m2         Blood Pressure from Last 3 Encounters:   10/16/18 112/70   10/09/18 111/69   10/02/18 123/82    Weight from Last 3 Encounters:   10/16/18 165 lb (74.8 kg)   10/09/18 161 lb (73 kg)   10/02/18 162 lb (73.5 kg)              Today, you had the following     No orders found for display       Primary Care Provider Fax #    Physician No Ref-Primary [phone]       No address on file        Equal Access to Services     [facility]: Hadii boby Andino, waaxda marzena, qaybta kaalmada arline, toño keen . So [facility] [phone].    ATENCIÓN: Si habla español, tiene a way disposición servicios gratuitos de asistencia lingüística. Llame al [phone].    We comply with applicable federal civil rights laws and [place] laws. We do not discriminate on the basis of race, color, national origin, age, disability, sex, sexual orientation, or gender identity.            Thank you!     Thank you for choosing [facility]  [place] your [place]. Our goal is always to provide you with excellent care. Hearing back from our patients is one way we can continue to improve our services. Please take a few minutes to complete the written survey that you may receive in the mail after your visit with us. Thank you!             Your Updated Medication List - Protect others around you: Learn how to safely use, store and throw away your medicines at www.disposemymeds.org.          This list is accurate as of 10/16/18  4:44 PM.  Always use your most recent med list.                   Brand Name Dispense  Instructions for use Diagnosis    COLACE PO           order for DME     1 pump    Equipment being ordered: double electric breast pump    Supervision of normal first pregnancy, antepartum

## 2018-10-24 ENCOUNTER — PRENATAL OFFICE VISIT (OUTPATIENT)
Dept: OBGYN | Facility: CLINIC | Age: 28
End: 2018-10-24
Payer: COMMERCIAL

## 2018-10-24 VITALS
HEART RATE: 99 BPM | WEIGHT: 165 LBS | TEMPERATURE: 97.7 F | SYSTOLIC BLOOD PRESSURE: 117 MMHG | DIASTOLIC BLOOD PRESSURE: 75 MMHG | BODY MASS INDEX: 24.72 KG/M2

## 2018-10-24 DIAGNOSIS — Z34.00 SUPERVISION OF NORMAL FIRST PREGNANCY, ANTEPARTUM: Primary | ICD-10-CM

## 2018-10-24 PROCEDURE — 99207 ZZC PRENATAL VISIT: CPT | Performed by: OBSTETRICS & GYNECOLOGY

## 2018-10-24 NOTE — PROGRESS NOTES
39w3d  Doing well.  Has mild cramping, an occ painful contractions.  No vaginal bleeding or leakage of fluid.  + FM.  Seemed less earlier today, but is picking up.  Cervix still closed today.  Discussed 41w induction if not delivered.  Reviewed labor precautions, fetal kick counts.  RTC 1w  Amee Becerra MD

## 2018-10-24 NOTE — MR AVS SNAPSHOT
After Visit Summary   10/24/2018    Barb Levy    MRN: [id number]           Patient Information     Date Of Birth          1990        Visit Information        Provider Department      10/24/2018 3:45 PM Amee Becerra MD [facility]        Today's Diagnoses     Supervision of normal first pregnancy, antepartum    -  1       Follow-ups after your visit        Your next 10 appointments already scheduled     Oct 31, 2018  4:30 PM CDT   ESTABLISHED PRENATAL with Amee Becerra MD   [facility] ([facility])    [address] [place] [id number] [phone]              Who to contact     If you have questions or need follow up information about today's clinic visit or your schedule please contact [facility] directly at [phone].  Normal or non-critical lab and imaging results will be communicated to you by MyChart, letter or phone within 4 business days after the clinic has received the results. If you do not hear from us within 7 days, please contact the clinic through [employer] Kore[employer]art or phone. If you have a critical or abnormal lab result, we will notify you by phone as soon as possible.  Submit refill requests through [employer] or call your pharmacy and they will forward the refill request to us. Please allow 3 business days for your refill to be completed.          Additional Information About Your Visit        MyChart Information     Nu[employer] gives you secure access to your electronic health record. If you see a primary care provider, you can also send messages to your care team and make appointments. If you have questions, please call your primary care clinic.  If you do not have a primary care provider, please call [phone] and they will assist you.        Care EveryWhere ID     This is your Care EveryWhere ID. This could be used by other organizations to access your [facility]  records  [id number]        Your Vitals Were     Pulse Temperature Last Period BMI (Body Mass Index)          99 97.7  F (36.5  C) (Oral) 01/21/2018 24.72 kg/m2         Blood Pressure from Last 3 Encounters:   10/24/18 117/75   10/16/18 112/70   10/09/18 111/69    Weight from Last 3 Encounters:   10/24/18 165 lb (74.8 kg)   10/16/18 165 lb (74.8 kg)   10/09/18 161 lb (73 kg)              Today, you had the following     No orders found for display       Primary Care Provider Fax #    Physician No Ref-Primary [phone]       No address on file        Equal Access to Services     [facility] : Lencho Andino, mikki ivan, ángel nunn, toño keen . So [facility] [phone].    ATENCIÓN: Si habla español, tiene a way disposición servicios gratuitos de asistencia lingüística. Llame al [phone].    We comply with applicable federal civil rights laws and [place] laws. We do not discriminate on the basis of race, color, national origin, age, disability, sex, sexual orientation, or gender identity.            Thank you!     Thank you for choosing [facility]  for your [place]. Our goal is always to provide you with excellent care. Hearing back from our patients is one way we can continue to improve our services. Please take a few minutes to complete the written survey that you may receive in the mail after your visit with us. Thank you!             Your Updated Medication List - Protect others around you: Learn how to safely use, store and throw away your medicines at www.disposemymeds.org.          This list is accurate as of 10/24/18  4:21 PM.  Always use your most recent med list.                   Brand Name Dispense Instructions for use Diagnosis    COLACE PO           order for DME     1 pump    Equipment being ordered: double electric breast pump    Supervision of normal first pregnancy, antepartum

## 2018-10-31 ENCOUNTER — PRENATAL OFFICE VISIT (OUTPATIENT)
Dept: OBGYN | Facility: CLINIC | Age: 28
End: 2018-10-31
Payer: COMMERCIAL

## 2018-10-31 VITALS
WEIGHT: 163 LBS | BODY MASS INDEX: 24.42 KG/M2 | SYSTOLIC BLOOD PRESSURE: 114 MMHG | DIASTOLIC BLOOD PRESSURE: 75 MMHG | TEMPERATURE: 97.2 F | HEART RATE: 73 BPM

## 2018-10-31 DIAGNOSIS — Z34.00 SUPERVISION OF NORMAL FIRST PREGNANCY, ANTEPARTUM: Primary | ICD-10-CM

## 2018-10-31 PROCEDURE — 99207 ZZC PRENATAL VISIT: CPT | Performed by: OBSTETRICS & GYNECOLOGY

## 2018-10-31 NOTE — PROGRESS NOTES

## 2018-10-31 NOTE — MR AVS SNAPSHOT
After Visit Summary   10/31/2018    Barb Levy    MRN: [id number]           Patient Information     Date Of Birth          1990        Visit Information        Provider Department      10/31/2018 4:30 PM Amee Becerra MD [facility]        Today's Diagnoses     Supervision of normal first pregnancy, antepartum    -  1       Follow-ups after your visit        Who to contact     If you have questions or need follow up information about today's clinic visit or your schedule please contact [facility] directly at [phone].  Normal or non-critical lab and imaging results will be communicated to you by WILEXhart, letter or phone within 4 business days after the clinic has received the results. If you do not hear from us within 7 days, please contact the clinic through [employer] or phone. If you have a critical or abnormal lab result, we will notify you by phone as soon as possible.  Submit refill requests through Cleankeys or call your pharmacy and they will forward the refill request to us. Please allow 3 business days for your refill to be completed.          Additional Information About Your Visit        MyChart Information     Cleankeys gives you secure access to your electronic health record. If you see a primary care provider, you can also send messages to your care team and make appointments. If you have questions, please call your primary care clinic.  If you do not have a primary care provider, please call [phone] and they will assist you.        Care EveryWhere ID     This is your Care EveryWhere ID. This could be used by other organizations to access your [place] medical records  [id number]        Your Vitals Were     Pulse Temperature Last Period BMI (Body Mass Index)          73 97.2  F (36.2  C) (Oral) 01/21/2018 24.42 kg/m2         Blood Pressure from Last 3 Encounters:   10/31/18 114/75   10/24/18 117/75   10/16/18 112/70    Weight  from Last 3 Encounters:   10/31/18 163 lb (73.9 kg)   10/24/18 165 lb (74.8 kg)   10/16/18 165 lb (74.8 kg)              Today, you had the following     No orders found for display       Primary Care Provider Fax #    Physician No Ref-Primary [phone]       No address on file        Equal Access to Services     [facility] : Hadii aad ku hadasho Soomaali, waaxda luqadaha, qaybta kaalmada adeegyada, toño borrerocorriechloé keen . So [facility] [phone].    ATENCIÓN: Si habla español, tiene a way disposición servicios gratuitos de asistencia lingüística. Llame al [phone].    We comply with applicable federal civil rights laws and [place] laws. We do not discriminate on the basis of race, color, national origin, age, disability, sex, sexual orientation, or gender identity.            Thank you!     Thank you for choosing [facility]  for your care. Our goal is always to provide you with excellent care. Hearing back from our patients is one way we can continue to improve our services. Please take a few minutes to complete the written survey that you may receive in the mail after your visit with us. Thank you!             Your Updated Medication List - Protect others around you: Learn how to safely use, store and throw away your medicines at www.disposemymeds.org.          This list is accurate as of 10/31/18 11:59 PM.  Always use your most recent med list.                   Brand Name Dispense Instructions for use Diagnosis    COLACE PO           order for DME     1 pump    Equipment being ordered: double electric breast pump    Supervision of normal first pregnancy, antepartum

## 2018-10-31 NOTE — PROGRESS NOTES
40w3d  Tried walking a lot over the weekend to help bring on labor.  Had contractions while walking, but they ceased when she returned home.  No vaginal bleeding or leakage of fluid.  + FM  Scheduled 41w induction for Sunday, 10/4.  9:00 arrival.  We briefly reviewed what to expect during induction with cervical ripening.  Had discussed in more detail at previous visit and patient had no further questions.  Reviewed labor precautions and fetal kick counts.  Amee Becerra MD

## 2018-11-01 RX ORDER — OXYCODONE AND ACETAMINOPHEN 5; 325 MG/1; MG/1
1 TABLET ORAL
Status: CANCELLED | OUTPATIENT
Start: 2018-11-01

## 2018-11-01 RX ORDER — IBUPROFEN 200 MG
800 TABLET ORAL
Status: CANCELLED | OUTPATIENT
Start: 2018-11-01

## 2018-11-01 RX ORDER — SODIUM CHLORIDE, SODIUM LACTATE, POTASSIUM CHLORIDE, CALCIUM CHLORIDE 600; 310; 30; 20 MG/100ML; MG/100ML; MG/100ML; MG/100ML
INJECTION, SOLUTION INTRAVENOUS CONTINUOUS
Status: CANCELLED | OUTPATIENT
Start: 2018-11-01

## 2018-11-01 RX ORDER — METHYLERGONOVINE MALEATE 0.2 MG/ML
200 INJECTION INTRAVENOUS
Status: CANCELLED | OUTPATIENT
Start: 2018-11-01

## 2018-11-01 RX ORDER — CARBOPROST TROMETHAMINE 250 UG/ML
250 INJECTION, SOLUTION INTRAMUSCULAR
Status: CANCELLED | OUTPATIENT
Start: 2018-11-01

## 2018-11-01 RX ORDER — NALOXONE HYDROCHLORIDE 0.4 MG/ML
.1-.4 INJECTION, SOLUTION INTRAMUSCULAR; INTRAVENOUS; SUBCUTANEOUS
Status: CANCELLED | OUTPATIENT
Start: 2018-11-01

## 2018-11-01 RX ORDER — ONDANSETRON 2 MG/ML
4 INJECTION INTRAMUSCULAR; INTRAVENOUS EVERY 6 HOURS PRN
Status: CANCELLED | OUTPATIENT
Start: 2018-11-01

## 2018-11-01 RX ORDER — FENTANYL CITRATE 50 UG/ML
50-100 INJECTION, SOLUTION INTRAMUSCULAR; INTRAVENOUS
Status: CANCELLED | OUTPATIENT
Start: 2018-11-01

## 2018-11-01 RX ORDER — OXYTOCIN/0.9 % SODIUM CHLORIDE 30/500 ML
100-340 PLASTIC BAG, INJECTION (ML) INTRAVENOUS CONTINUOUS PRN
Status: CANCELLED | OUTPATIENT
Start: 2018-11-01

## 2018-11-01 RX ORDER — ACETAMINOPHEN 325 MG/1
650 TABLET ORAL EVERY 4 HOURS PRN
Status: CANCELLED | OUTPATIENT
Start: 2018-11-01

## 2018-11-01 RX ORDER — OXYTOCIN 10 [USP'U]/ML
10 INJECTION, SOLUTION INTRAMUSCULAR; INTRAVENOUS
Status: CANCELLED | OUTPATIENT
Start: 2018-11-01

## 2018-11-04 ENCOUNTER — HOSPITAL ENCOUNTER (INPATIENT)
Facility: CLINIC | Age: 28
LOS: 3 days | Discharge: HOME HEALTH | End: 2018-11-07
Attending: OBSTETRICS & GYNECOLOGY | Admitting: OBSTETRICS & GYNECOLOGY
Payer: COMMERCIAL

## 2018-11-04 PROBLEM — Z34.90 PREGNANCY: Status: ACTIVE | Noted: 2018-11-04

## 2018-11-04 LAB
ABO + RH BLD: NORMAL
ABO + RH BLD: NORMAL
BASOPHILS # BLD AUTO: 0 10E9/L (ref 0–0.2)
BASOPHILS NFR BLD AUTO: 0.3 %
BLD GP AB SCN SERPL QL: NORMAL
BLOOD BANK CMNT PATIENT-IMP: NORMAL
DIFFERENTIAL METHOD BLD: ABNORMAL
EOSINOPHIL # BLD AUTO: 0.1 10E9/L (ref 0–0.7)
EOSINOPHIL NFR BLD AUTO: 1.2 %
ERYTHROCYTE [DISTWIDTH] IN BLOOD BY AUTOMATED COUNT: 13.2 % (ref 10–15)
HCT VFR BLD AUTO: 36 % (ref 35–47)
HGB BLD-MCNC: 11.6 G/DL (ref 11.7–15.7)
IMM GRANULOCYTES # BLD: 0 10E9/L (ref 0–0.4)
IMM GRANULOCYTES NFR BLD: 0.3 %
LYMPHOCYTES # BLD AUTO: 1.6 10E9/L (ref 0.8–5.3)
LYMPHOCYTES NFR BLD AUTO: 21 %
MCH RBC QN AUTO: 30.1 PG (ref 26.5–33)
MCHC RBC AUTO-ENTMCNC: 32.2 G/DL (ref 31.5–36.5)
MCV RBC AUTO: 94 FL (ref 78–100)
MONOCYTES # BLD AUTO: 0.6 10E9/L (ref 0–1.3)
MONOCYTES NFR BLD AUTO: 8 %
NEUTROPHILS # BLD AUTO: 5.4 10E9/L (ref 1.6–8.3)
NEUTROPHILS NFR BLD AUTO: 69.2 %
NRBC # BLD AUTO: 0 10*3/UL
NRBC BLD AUTO-RTO: 0 /100
PLATELET # BLD AUTO: 195 10E9/L (ref 150–450)
RBC # BLD AUTO: 3.85 10E12/L (ref 3.8–5.2)
SPECIMEN EXP DATE BLD: NORMAL
WBC # BLD AUTO: 7.8 10E9/L (ref 4–11)

## 2018-11-04 PROCEDURE — 86850 RBC ANTIBODY SCREEN: CPT | Performed by: OBSTETRICS & GYNECOLOGY

## 2018-11-04 PROCEDURE — 12000032 ZZH R&B OB CRITICAL UMMC

## 2018-11-04 PROCEDURE — 25000132 ZZH RX MED GY IP 250 OP 250 PS 637: Performed by: STUDENT IN AN ORGANIZED HEALTH CARE EDUCATION/TRAINING PROGRAM

## 2018-11-04 PROCEDURE — 86900 BLOOD TYPING SEROLOGIC ABO: CPT | Performed by: OBSTETRICS & GYNECOLOGY

## 2018-11-04 PROCEDURE — 3E0P7VZ INTRODUCTION OF HORMONE INTO FEMALE REPRODUCTIVE, VIA NATURAL OR ARTIFICIAL OPENING: ICD-10-PCS | Performed by: OBSTETRICS & GYNECOLOGY

## 2018-11-04 PROCEDURE — 25000128 H RX IP 250 OP 636: Performed by: STUDENT IN AN ORGANIZED HEALTH CARE EDUCATION/TRAINING PROGRAM

## 2018-11-04 PROCEDURE — 86780 TREPONEMA PALLIDUM: CPT | Performed by: OBSTETRICS & GYNECOLOGY

## 2018-11-04 PROCEDURE — 85025 COMPLETE CBC W/AUTO DIFF WBC: CPT | Performed by: OBSTETRICS & GYNECOLOGY

## 2018-11-04 PROCEDURE — 36415 COLL VENOUS BLD VENIPUNCTURE: CPT | Performed by: OBSTETRICS & GYNECOLOGY

## 2018-11-04 PROCEDURE — 86901 BLOOD TYPING SEROLOGIC RH(D): CPT | Performed by: OBSTETRICS & GYNECOLOGY

## 2018-11-04 PROCEDURE — 59200 INSERT CERVICAL DILATOR: CPT | Mod: GC | Performed by: OBSTETRICS & GYNECOLOGY

## 2018-11-04 RX ORDER — METHYLERGONOVINE MALEATE 0.2 MG/ML
200 INJECTION INTRAVENOUS
Status: DISCONTINUED | OUTPATIENT
Start: 2018-11-04 | End: 2018-11-05

## 2018-11-04 RX ORDER — ASCORBIC ACID 250 MG
250 TABLET,CHEWABLE ORAL DAILY
COMMUNITY

## 2018-11-04 RX ORDER — OXYTOCIN 10 [USP'U]/ML
10 INJECTION, SOLUTION INTRAMUSCULAR; INTRAVENOUS
Status: DISCONTINUED | OUTPATIENT
Start: 2018-11-04 | End: 2018-11-05

## 2018-11-04 RX ORDER — OXYCODONE AND ACETAMINOPHEN 5; 325 MG/1; MG/1
1 TABLET ORAL
Status: DISCONTINUED | OUTPATIENT
Start: 2018-11-04 | End: 2018-11-05

## 2018-11-04 RX ORDER — PRENATAL VIT/IRON FUM/FOLIC AC 27MG-0.8MG
1 TABLET ORAL DAILY
COMMUNITY

## 2018-11-04 RX ORDER — NALOXONE HYDROCHLORIDE 0.4 MG/ML
.1-.4 INJECTION, SOLUTION INTRAMUSCULAR; INTRAVENOUS; SUBCUTANEOUS
Status: DISCONTINUED | OUTPATIENT
Start: 2018-11-04 | End: 2018-11-05

## 2018-11-04 RX ORDER — MISOPROSTOL 100 UG/1
25 TABLET ORAL EVERY 4 HOURS PRN
Status: DISCONTINUED | OUTPATIENT
Start: 2018-11-04 | End: 2018-11-04

## 2018-11-04 RX ORDER — ONDANSETRON 2 MG/ML
4 INJECTION INTRAMUSCULAR; INTRAVENOUS EVERY 6 HOURS PRN
Status: DISCONTINUED | OUTPATIENT
Start: 2018-11-04 | End: 2018-11-05

## 2018-11-04 RX ORDER — SODIUM CHLORIDE, SODIUM LACTATE, POTASSIUM CHLORIDE, CALCIUM CHLORIDE 600; 310; 30; 20 MG/100ML; MG/100ML; MG/100ML; MG/100ML
INJECTION, SOLUTION INTRAVENOUS CONTINUOUS
Status: DISCONTINUED | OUTPATIENT
Start: 2018-11-04 | End: 2018-11-05

## 2018-11-04 RX ORDER — FENTANYL CITRATE 50 UG/ML
50-100 INJECTION, SOLUTION INTRAMUSCULAR; INTRAVENOUS
Status: DISCONTINUED | OUTPATIENT
Start: 2018-11-04 | End: 2018-11-05

## 2018-11-04 RX ORDER — ACETAMINOPHEN 325 MG/1
650 TABLET ORAL EVERY 4 HOURS PRN
Status: DISCONTINUED | OUTPATIENT
Start: 2018-11-04 | End: 2018-11-05

## 2018-11-04 RX ORDER — IBUPROFEN 800 MG/1
800 TABLET, FILM COATED ORAL
Status: COMPLETED | OUTPATIENT
Start: 2018-11-04 | End: 2018-11-05

## 2018-11-04 RX ORDER — OXYTOCIN/0.9 % SODIUM CHLORIDE 30/500 ML
100-340 PLASTIC BAG, INJECTION (ML) INTRAVENOUS CONTINUOUS PRN
Status: DISCONTINUED | OUTPATIENT
Start: 2018-11-04 | End: 2018-11-05

## 2018-11-04 RX ORDER — CARBOPROST TROMETHAMINE 250 UG/ML
250 INJECTION, SOLUTION INTRAMUSCULAR
Status: DISCONTINUED | OUTPATIENT
Start: 2018-11-04 | End: 2018-11-05

## 2018-11-04 RX ADMIN — SODIUM CHLORIDE, POTASSIUM CHLORIDE, SODIUM LACTATE AND CALCIUM CHLORIDE 500 ML: 600; 310; 30; 20 INJECTION, SOLUTION INTRAVENOUS at 17:24

## 2018-11-04 RX ADMIN — Medication 25 MCG: at 10:48

## 2018-11-04 ASSESSMENT — ACTIVITIES OF DAILY LIVING (ADL)
PRIOR_FUNCTIONAL_LEVEL_COMMENT: INDEPENDENT
AMBULATION: 0-->INDEPENDENT
FALL_HISTORY_WITHIN_LAST_SIX_MONTHS: NO
DRESS: 0-->INDEPENDENT
BATHING: 0-->INDEPENDENT
TRANSFERRING: 0-->INDEPENDENT
SWALLOWING: 0-->SWALLOWS FOODS/LIQUIDS WITHOUT DIFFICULTY
RETIRED_COMMUNICATION: 0-->UNDERSTANDS/COMMUNICATES WITHOUT DIFFICULTY
TOILETING: 0-->INDEPENDENT
COGNITION: 0 - NO COGNITION ISSUES REPORTED
RETIRED_EATING: 0-->INDEPENDENT

## 2018-11-04 NOTE — IP AVS SNAPSHOT
MRN:[id number]                      After Visit Summary   11/4/2018    Barb Levy    MRN: [id number]           Thank you!     Thank you for choosing [facility] for your care. Our goal is always to provide you with excellent care. Hearing back from our patients is one way we can continue to improve our services. Please take a few minutes to complete the written survey that you may receive in the mail after you visit with us. Thank you!        Patient Information     Date Of Birth          1990        Designated Caregiver       Most Recent Value    Caregiver    Will someone help with your care after discharge? no      About your hospital stay     You were admitted on:  November 4, 2018 You last received care in the:  [facility]    You were discharged on:  November 7, 2018        Reason for your hospital stay       Maternity care                  Who to Call     For medical emergencies, please call 911.  For non-urgent questions about your medical care, please call your primary care provider or clinic, None          Attending Provider     Provider Specialty    Rachell Medina MD OB/Gyn       Primary Care Provider Fax #    Physician No Ref-Primary [phone]      After Care Instructions     Activity       Review discharge instructions            Diet       Resume previous diet            Discharge Instructions       Activity Instructions:   - Vaginal delivery: Nothing in the vagina for 6 weeks, no intercourse for 6 weeks, you are not restricted on other activities, but rest for 1-2 weeks to allow your body to recover from delivery. No driving until you have stopped taking your pain medications.  Call your health care provider if you have any of the following: Fever above 100.4 F; opening or drainage from your incision; soaking a sanitary pad with blood within 1 hour, or you see blood clots larger than a golf ball; malodorous vaginal discharge, severe or worsening pain uncontrolled by  your pain medications, nausea and vomiting, severe headaches, changes in vision, calf swelling or pain, shortness of breath, problems coping with sadness, anxiety, or depression.  If you have any concerns about hurting yourself or the baby, call your provider immediately. You are encouraged to call with questions or concerns after you return home.    Monitor and record:  Vaginal bleeding - call your provider if you are soaking more than a pad an hour for 2 hours, or passing clots larger than a golf ball.  Temperature - if you feel as though you may have a fever, take your temperature. If it is 100.4 F or more, please contact your provider.            Discharge Instructions - Postpartum visit       Schedule postpartum visit with your provider and return to clinic in 6 weeks.                  Further instructions from your care team       Postpartum Vaginal Delivery Instructions    Activity       Ask family and friends for help when you need it.    Do not place anything in your vagina for 6 weeks.    You are not restricted on other activities, but take it easy for a few weeks to allow your body to recover from delivery.  You are able to do any activities you feel up to that point.    No driving until you have stopped taking your pain medications (usually two weeks after delivery).     Call your health care provider if you have any of these symptoms:       Increased pain, swelling, redness, or fluid around your stiches from an episiotomy or perineal tear.    A fever above 100.4 F (38 C) with or without chills when placing a thermometer under your tongue.    You soak a sanitary pad with blood within 1 hour, or you see blood clots larger than a golf ball.    Bleeding that lasts more than 6 weeks.    Vaginal discharge that smells bad.    Severe pain, cramping or tenderness in your lower belly area.    A need to urinate more frequently (use the toilet more often), more urgently (use the toilet very quickly), or it burns  when you urinate.    Nausea and vomiting.    Redness, swelling or pain around a vein in your leg.    Problems breastfeeding or a red or painful area on your breast.    Chest pain and cough or are gasping for air.    Problems coping with sadness, anxiety, or depression.  If you have any concerns about hurting yourself or the baby, call your provider immediately.     You have questions or concerns after you return home.     Keep your hands clean:  Always wash your hands before touching your perineal area and stitches.  This helps reduce your risk of infection.  If your hands aren't dirty, you may use an alcohol hand-rub to clean your hands. Keep your nails clean and short.        Pending Results     No orders found from 11/2/2018 to 11/5/2018.            Statement of Approval     Ordered          11/07/18 0844  I have reviewed and agree with all the recommendations and orders detailed in this document.  EFFECTIVE NOW     Approved and electronically signed by:  Shea Stern MD             Admission Information     Date & Time Provider Department Dept. Phone    11/4/2018 Rachell Medina MD [facility] [phone]      Your Vitals Were     Blood Pressure Pulse Temperature Respirations Last Period Pulse Oximetry    112/80 75 98  F (36.7  C) (Oral) 16 01/21/2018 96%      MyChart Information     [employer] gives you secure access to your electronic health record. If you see a primary care provider, you can also send messages to your care team and make appointments. If you have questions, please call your primary care clinic.  If you do not have a primary care provider, please call [phone] and they will assist you.        Care EveryWhere ID     This is your Care EveryWhere ID. This could be used by other organizations to access your [place] medical records  [id number]        Equal Access to Services     [facility]ON[facility]ITT[facility]: mikki Julian qaybta kaalmada adeegyada, waxay idiin hayaan  susanarron adaircorriechloé son'aan ah. So [facility] [phone].    ATENCIÓN: Si lázaro avilez, tiene a way disposición servicios gratuitos de asistencia lingüística. Chase al [phone].    We comply with applicable federal civil rights laws and [place] laws. We do not discriminate on the basis of race, color, national origin, age, disability, sex, sexual orientation, or gender identity.               Review of your medicines      START taking        Dose / Directions    ibuprofen 800 MG tablet   Commonly known as:  ADVIL/MOTRIN        Dose:  800 mg   Take 1 tablet (800 mg) by mouth every 6 hours as needed for other (cramping)   Quantity:  60 tablet   Refills:  0       senna-docusate 8.6-50 MG per tablet   Commonly known as:  SENOKOT-S;PERICOLACE        Dose:  2 tablet   Take 2 tablets by mouth 2 times daily as needed for constipation   Quantity:  100 tablet   Refills:  0         CONTINUE these medicines which have NOT CHANGED        Dose / Directions    ascorbic acid 250 MG Chew chewable tablet   Commonly known as:  vitamin C        Dose:  250 mg   Take 250 mg by mouth daily   Refills:  0       order for DME   Used for:  Supervision of normal first pregnancy, antepartum        Equipment being ordered: double electric breast pump   Quantity:  1 pump   Refills:  0       prenatal multivitamin plus iron 27-0.8 MG Tabs per tablet        Dose:  1 tablet   Take 1 tablet by mouth daily   Refills:  0         STOP taking     COLACE PO                Where to get your medicines      These medications were sent to [facility] [place], [place] - [address]  [address] [facility], [facility] [address]     Phone:  [phone]     ibuprofen 800 MG tablet         Some of these will need a paper prescription and others can be bought over the counter. Ask your nurse if you have questions.     You don't need a prescription for these medications     senna-docusate 8.6-50 MG per tablet                Protect others around you:  Learn how to safely use, store and throw away your medicines at www.disposemymeds.org.             Medication List: This is a list of all your medications and when to take them. Check marks below indicate your daily home schedule. Keep this list as a reference.      Medications           Morning Afternoon Evening Bedtime As Needed    ascorbic acid 250 MG Chew chewable tablet   Commonly known as:  vitamin C   Take 250 mg by mouth daily                                ibuprofen 800 MG tablet   Commonly known as:  ADVIL/MOTRIN   Take 1 tablet (800 mg) by mouth every 6 hours as needed for other (cramping)   Last time this was given:  800 mg on 11/7/2018  6:52 AM                                order for DME   Equipment being ordered: double electric breast pump                                prenatal multivitamin plus iron 27-0.8 MG Tabs per tablet   Take 1 tablet by mouth daily                                senna-docusate 8.6-50 MG per tablet   Commonly known as:  SENOKOT-S;PERICOLACE   Take 2 tablets by mouth 2 times daily as needed for constipation   Last time this was given:  2 tablets on 11/7/2018  9:56 AM

## 2018-11-04 NOTE — PROGRESS NOTES
Attending Progress Note    Feeling some cramping s/p vaginal misoprostol.  Patient describes it as mild.  No leaking or bleeding.    FHT:  150/ mod / + accels.  No decels, although inconsistent tracing recently  [place]:  q2-6min  SVE:  Deferred on last resident exam.    Delayed vaginal misoprostol dose due to frequent contractions.  If contractions don't space out soon, will give IVF bolus and see if contractions space enough to place another dose of miso.    Amee Becerra MD

## 2018-11-04 NOTE — PROGRESS NOTES
[facility]  Labor Progress Note    Subjective:  Patient is doing well, s/p 1 vaginal miso. Feeling contractions, but they are not painful.     Objective:   Patient Vitals for the past 4 hrs:   BP Temp Temp src Resp   18 1146 109/70 98.1  F (36.7  C) Oral 16   18 1053 116/78 - - -     SVE: FT external os, closed internal/50/-3    FHT: Baseline 140, moderate variability, accelerations present, no decelerations  [place]: Contractions q 1-3 minutes    Assessment/Plan:  Ms. Barb Levy is a 28 year old , at 41w0d by LMP c/w 7w5d US, who presents for IOL for post-dates.    - Labor   - SVE: FT at external, closed internal/50/-3   - Given frequency of contractions, will delay vaginal miso at this time. Consider fluid bolus.   - PNC:                - Rh positive. Rubella immune. GBS negative. No intervention indicated.                - Placenta: posterior, no previa   - Membranes: intact, AROM as appropriate   - Pain management: Desires nitrous for analgesia    -Fetal Well Being   - Category 1 FHT. Reactive and reassuring   - Cephalic by BSUS. EFW 7.5# by Leopold's.   - Continue EFM and Bettles    Reilly Mcneal MD  OB/GYN Resident, PGY-1  2018 2:42 PM

## 2018-11-04 NOTE — PROVIDER NOTIFICATION
11/04/18 1500   Provider Notification   Provider Name/Title [facility]   Method of Notification At Bedside   Request Evaluate in Person   Notification Reason Status Update   Providers present to evaluate in person. Pt. SVE unchanged, uterine activity q3-4 mins, will await spacing of ctx. Prior to any further ripening administration. Will proceed with ongoing assessment.

## 2018-11-04 NOTE — IP AVS SNAPSHOT
[facility]    [address] [address]    Phone:  [phone]                                       After Visit Summary   11/4/2018    Barb Levy    MRN: [id number]           After Visit Summary Signature Page     I have received my discharge instructions, and my questions have been answered. I have discussed any challenges I see with this plan with the nurse or doctor.    ..........................................................................................................................................  Patient/Patient Representative Signature      ..........................................................................................................................................  Patient Representative Print Name and Relationship to Patient    ..................................................               ................................................  Date                                   Time    ..........................................................................................................................................  Reviewed by Signature/Title    ...................................................              ..............................................  Date                                               Time          22EPIC Rev 08/18

## 2018-11-04 NOTE — PROVIDER NOTIFICATION
11/04/18 1014   Provider Notification   Provider Name/Title Fredis   Method of Notification At Bedside   Request Evaluate in Person   Notification Reason Patient Arrived   Provider present to perform SVE and discuss plan of care. Will proceed with ongoing assessment.

## 2018-11-04 NOTE — H&P
[facility]  OB History and Physical      Barb Levy MRN# [id number]   Age: 28 year old YOB: 1990     CC:  IOL    HPI:  Ms. Barb Levy is a 28 year old  at 41w0d by LMP c/w 7w5d US, who presents for IOL for post-dates. She notes intermittent contractions but no vaginal bleeding/discharge, loss of fluid. Good fetal movement. Cervix unfavorable in clinic on 10/31 (0/50/-3).    Pregnancy Complications:  1. UTI x2 in early pregnancy, negative cultured, treated  2. Abnormal US finding - lagging HC on outside US (EFW 10%ile, HC 4%ile), resolved on repeat scan    Prenatal Labs:   Lab Results   Component Value Date    ABO A 2018    RH Pos 2018    AS negative 2018    HEPBANG negative 2018    CHPCRT negative 2018    GCPCRT negative 2018    TREPAB non-reactive 2018    HGB 12.1 10/02/2018       GBS Status:   Lab Results   Component Value Date    GBS Negative 10/02/2018       Ultrasounds  1. Dating 3/19/18: IUP at 7w5d  2. Viability/vagina bleed 3/26/18: single viable IUP at 9w1d, subchorionic bleed inferior to GS  3. Anatomy 18: single IUP at 19w2d, EFW 243g, HC 6%ile, posterior placenta, normal RICHARD, no other abnormalities detected  4. Level II US 18: single IUP at 20w3d, EFW 10%ile, HC 4%ile, posterior placenta, normal RICHARD  5. Growth 18: single IUP 26w1d, no anomalies detected, HC normal at 17%ile, normal RICHARD, posterior placenta, no previa    OB History  Obstetric History       T0      L0     SAB0   TAB0   Ectopic0   Multiple0   Live Births0       # Outcome Date GA Lbr Kenny/2nd Weight Sex Delivery Anes PTL Lv   1 Current                   PMHx:   History reviewed. No pertinent past medical history.     PSHx:   Past Surgical History:   Procedure Laterality Date     HC TOOTH EXTRACTION W/FORCEP      wisdom teeth     Meds:   Prescriptions Prior to Admission   Medication Sig Dispense Refill  Last Dose     ascorbic acid (VITAMIN C) 250 MG CHEW chewable tablet Take 250 mg by mouth daily   11/3/2018 at Unknown time     Docusate Sodium (COLACE PO)    Past Month at Unknown time     Prenatal Vit-Fe Fumarate-FA (PRENATAL MULTIVITAMIN PLUS IRON) 27-0.8 MG TABS per tablet Take 1 tablet by mouth daily   Past Week at Unknown time     order for DME Equipment being ordered: double electric breast pump 1 pump 0      Allergies:    Allergies   Allergen Reactions     Wasps [Hornets] Swelling      FmHx:   Family History   Problem Relation Age of Onset     Melanoma Father      Colon Cancer Maternal Grandfather      HEART DISEASE Maternal Grandfather      Osteoporosis Paternal Grandmother      Bladder Cancer Paternal Grandfather      SocHx: She denies any tobacco, alcohol, or other drug use during this pregnancy.    ROS:   Complete 10-point ROS negative except as noted in HPI. She denies headache, blurry vision, chest pain, shortness of breath, RUQ pain, nausea, vomiting, dysuria, hematuria or extremity edema.    PE:  Vit: No data found.     Gen: Well-appearing, NAD, comfortable   CV: rrr, no mrg   Pulm: Ctab, no wheezes or crackles   Abd: Soft, gravid, non-tender  Ext: Trace LE edema b/l  Cx: 0/40/-3, soft, posterior    Pres:  cephalic by BSUS  EFW:  7.5# by Leopold's  Memb: Intact              FHT: Baseline 145, moderate variability, accelerations present, no decelerations   [place]: 1 contractions in 10 minutes      Assessment  Ms. Barb Levy is a 28 year old , at 41w0d by LMP c/w 7w5d US, who presents for IOL for post-dates.    Plan  - Labor   - Admit to L&D for IOL. Plan for vaginal miso.    - PNC:     - Rh positive. Rubella immune. GBS negative. No intervention indicated.     - Placenta: posterior, no previa   - SVE: 0/40/-3   - Membranes: intact   - Pain management: Desires nitrous for analgesia    -Fetal Well Being   - Category 1 FHT. Reactive and reassuring   - Cephalic by BSUS. EFW 7.5# by  Leopold's.   - Continue EFM and Forman    The patient was discussed with Dr. Becerra who is in agreement with the treatment plan.    Reilly Mcneal MD  OB/GYN Resident, PGY-1  11/4/2018 9:13 AM

## 2018-11-04 NOTE — PLAN OF CARE
Data: Patient admitted to room 444 at 0900. Patient is a . Prenatal record reviewed.     Medical History: History reviewed. No pertinent past medical history..  Gestational age 41w0d. Vital signs per doc flowsheet. Fetal movement present. Patient reports Induction Of Labor (post dates)   as reason for admission. Support persons Rafael present.  Action: Care of patient assumed at arrival. Verbal consent for EFM, external fetal monitors applied. Admission assessment completed. Patient and support persons educated on labor process. Patient instructed to report change in fetal movement, contractions, vaginal leaking of fluid or bleeding, abdominal pain, or any concerns related to the pregnancy to her nurse/physician. Patient oriented to room, call light in reach.   Response: Dr. Mcneal informed of patient arrival FHR and uterine activity. Plan per provider is to evaluate in person, perform SVE and initiate IOL. Patient verbalized understanding of education and verbalized agreement with plan.

## 2018-11-04 NOTE — PROVIDER NOTIFICATION
11/04/18 1048   Provider Notification   Provider Name/Title Hernan   Method of Notification At Bedside   Request Evaluate in Person   Notification Reason Patient Arrived   Provider at bedside to evaluate in person and perform SVE closed/40/-3. Plan to initiate vaginal miso. Will proceed with ongoing assessment.

## 2018-11-04 NOTE — PLAN OF CARE
Problem: Patient Care Overview  Goal: Plan of Care/Patient Progress Review  Outcome: No Change  Patient VSS and afebrile. Pt given one vaginal miso at approx 1048, uterine activity q2-3 mins at this time. 's category 1, see flowsheet for more details. Pt. Notified when to call out to RN, verbalized understanding and agreed to plan. Will proceed with ongoing assessment.

## 2018-11-05 ENCOUNTER — ANESTHESIA EVENT (OUTPATIENT)
Dept: OBGYN | Facility: CLINIC | Age: 28
End: 2018-11-05
Payer: COMMERCIAL

## 2018-11-05 ENCOUNTER — ANESTHESIA (OUTPATIENT)
Dept: OBGYN | Facility: CLINIC | Age: 28
End: 2018-11-05
Payer: COMMERCIAL

## 2018-11-05 LAB — T PALLIDUM AB SER QL: NONREACTIVE

## 2018-11-05 PROCEDURE — 00HU33Z INSERTION OF INFUSION DEVICE INTO SPINAL CANAL, PERCUTANEOUS APPROACH: ICD-10-PCS | Performed by: ANESTHESIOLOGY

## 2018-11-05 PROCEDURE — 72200001 ZZH LABOR CARE VAGINAL DELIVERY SINGLE

## 2018-11-05 PROCEDURE — 25000128 H RX IP 250 OP 636: Performed by: ANESTHESIOLOGY

## 2018-11-05 PROCEDURE — 25000132 ZZH RX MED GY IP 250 OP 250 PS 637: Performed by: STUDENT IN AN ORGANIZED HEALTH CARE EDUCATION/TRAINING PROGRAM

## 2018-11-05 PROCEDURE — 25000125 ZZHC RX 250: Performed by: STUDENT IN AN ORGANIZED HEALTH CARE EDUCATION/TRAINING PROGRAM

## 2018-11-05 PROCEDURE — 25000132 ZZH RX MED GY IP 250 OP 250 PS 637: Performed by: OBSTETRICS & GYNECOLOGY

## 2018-11-05 PROCEDURE — 0HB9XZZ EXCISION OF PERINEUM SKIN, EXTERNAL APPROACH: ICD-10-PCS | Performed by: OBSTETRICS & GYNECOLOGY

## 2018-11-05 PROCEDURE — 25000125 ZZHC RX 250: Performed by: ANESTHESIOLOGY

## 2018-11-05 PROCEDURE — 59400 OBSTETRICAL CARE: CPT | Performed by: OBSTETRICS & GYNECOLOGY

## 2018-11-05 PROCEDURE — 3E0R3BZ INTRODUCTION OF ANESTHETIC AGENT INTO SPINAL CANAL, PERCUTANEOUS APPROACH: ICD-10-PCS | Performed by: ANESTHESIOLOGY

## 2018-11-05 PROCEDURE — 12000032 ZZH R&B OB CRITICAL UMMC

## 2018-11-05 PROCEDURE — 25000128 H RX IP 250 OP 636: Performed by: STUDENT IN AN ORGANIZED HEALTH CARE EDUCATION/TRAINING PROGRAM

## 2018-11-05 PROCEDURE — 25000128 H RX IP 250 OP 636

## 2018-11-05 RX ORDER — ACETAMINOPHEN 325 MG/1
650 TABLET ORAL EVERY 4 HOURS PRN
Status: DISCONTINUED | OUTPATIENT
Start: 2018-11-05 | End: 2018-11-07 | Stop reason: HOSPADM

## 2018-11-05 RX ORDER — NALOXONE HYDROCHLORIDE 0.4 MG/ML
.1-.4 INJECTION, SOLUTION INTRAMUSCULAR; INTRAVENOUS; SUBCUTANEOUS
Status: DISCONTINUED | OUTPATIENT
Start: 2018-11-05 | End: 2018-11-07 | Stop reason: HOSPADM

## 2018-11-05 RX ORDER — BISACODYL 10 MG
10 SUPPOSITORY, RECTAL RECTAL DAILY PRN
Status: DISCONTINUED | OUTPATIENT
Start: 2018-11-07 | End: 2018-11-07 | Stop reason: HOSPADM

## 2018-11-05 RX ORDER — FENTANYL/BUPIVACAINE/NS/PF 2-1250MCG
PLASTIC BAG, INJECTION (ML) INJECTION
Status: COMPLETED
Start: 2018-11-05 | End: 2018-11-05

## 2018-11-05 RX ORDER — OXYTOCIN/0.9 % SODIUM CHLORIDE 30/500 ML
100 PLASTIC BAG, INJECTION (ML) INTRAVENOUS CONTINUOUS
Status: DISCONTINUED | OUTPATIENT
Start: 2018-11-05 | End: 2018-11-07 | Stop reason: HOSPADM

## 2018-11-05 RX ORDER — NALBUPHINE HYDROCHLORIDE 10 MG/ML
2.5-5 INJECTION, SOLUTION INTRAMUSCULAR; INTRAVENOUS; SUBCUTANEOUS EVERY 6 HOURS PRN
Status: DISCONTINUED | OUTPATIENT
Start: 2018-11-05 | End: 2018-11-05

## 2018-11-05 RX ORDER — EPHEDRINE SULFATE 50 MG/ML
INJECTION, SOLUTION INTRAMUSCULAR; INTRAVENOUS; SUBCUTANEOUS
Status: DISCONTINUED
Start: 2018-11-05 | End: 2018-11-05 | Stop reason: WASHOUT

## 2018-11-05 RX ORDER — TERBUTALINE SULFATE 1 MG/ML
0.25 INJECTION, SOLUTION SUBCUTANEOUS
Status: DISCONTINUED | OUTPATIENT
Start: 2018-11-05 | End: 2018-11-05

## 2018-11-05 RX ORDER — LANOLIN 100 %
OINTMENT (GRAM) TOPICAL
Status: DISCONTINUED | OUTPATIENT
Start: 2018-11-05 | End: 2018-11-07 | Stop reason: HOSPADM

## 2018-11-05 RX ORDER — EPHEDRINE SULFATE 50 MG/ML
5 INJECTION, SOLUTION INTRAMUSCULAR; INTRAVENOUS; SUBCUTANEOUS
Status: DISCONTINUED | OUTPATIENT
Start: 2018-11-05 | End: 2018-11-05

## 2018-11-05 RX ORDER — IBUPROFEN 800 MG/1
800 TABLET, FILM COATED ORAL EVERY 6 HOURS PRN
Status: DISCONTINUED | OUTPATIENT
Start: 2018-11-05 | End: 2018-11-07 | Stop reason: HOSPADM

## 2018-11-05 RX ORDER — OXYTOCIN 10 [USP'U]/ML
10 INJECTION, SOLUTION INTRAMUSCULAR; INTRAVENOUS
Status: DISCONTINUED | OUTPATIENT
Start: 2018-11-05 | End: 2018-11-07 | Stop reason: HOSPADM

## 2018-11-05 RX ORDER — OXYTOCIN/0.9 % SODIUM CHLORIDE 30/500 ML
1-24 PLASTIC BAG, INJECTION (ML) INTRAVENOUS CONTINUOUS
Status: DISCONTINUED | OUTPATIENT
Start: 2018-11-05 | End: 2018-11-05

## 2018-11-05 RX ORDER — OXYTOCIN/0.9 % SODIUM CHLORIDE 30/500 ML
340 PLASTIC BAG, INJECTION (ML) INTRAVENOUS CONTINUOUS PRN
Status: DISCONTINUED | OUTPATIENT
Start: 2018-11-05 | End: 2018-11-07 | Stop reason: HOSPADM

## 2018-11-05 RX ORDER — AMOXICILLIN 250 MG
2 CAPSULE ORAL 2 TIMES DAILY PRN
Qty: 100 TABLET | Refills: 0 | Status: SHIPPED | OUTPATIENT
Start: 2018-11-05 | End: 2018-11-06

## 2018-11-05 RX ORDER — NALOXONE HYDROCHLORIDE 0.4 MG/ML
.1-.4 INJECTION, SOLUTION INTRAMUSCULAR; INTRAVENOUS; SUBCUTANEOUS
Status: DISCONTINUED | OUTPATIENT
Start: 2018-11-05 | End: 2018-11-05

## 2018-11-05 RX ORDER — AMOXICILLIN 250 MG
1 CAPSULE ORAL 2 TIMES DAILY
Status: DISCONTINUED | OUTPATIENT
Start: 2018-11-05 | End: 2018-11-07 | Stop reason: HOSPADM

## 2018-11-05 RX ORDER — IBUPROFEN 800 MG/1
800 TABLET, FILM COATED ORAL EVERY 6 HOURS PRN
Qty: 60 TABLET | Refills: 0 | Status: SHIPPED | OUTPATIENT
Start: 2018-11-05

## 2018-11-05 RX ORDER — HYDROCORTISONE 2.5 %
CREAM (GRAM) TOPICAL 3 TIMES DAILY PRN
Status: DISCONTINUED | OUTPATIENT
Start: 2018-11-05 | End: 2018-11-07 | Stop reason: HOSPADM

## 2018-11-05 RX ORDER — AMOXICILLIN 250 MG
2 CAPSULE ORAL 2 TIMES DAILY
Status: DISCONTINUED | OUTPATIENT
Start: 2018-11-05 | End: 2018-11-07 | Stop reason: HOSPADM

## 2018-11-05 RX ORDER — MISOPROSTOL 200 UG/1
TABLET ORAL
Status: DISCONTINUED
Start: 2018-11-05 | End: 2018-11-05 | Stop reason: WASHOUT

## 2018-11-05 RX ORDER — LIDOCAINE 40 MG/G
CREAM TOPICAL
Status: DISCONTINUED | OUTPATIENT
Start: 2018-11-05 | End: 2018-11-05

## 2018-11-05 RX ORDER — LIDOCAINE HYDROCHLORIDE AND EPINEPHRINE 15; 5 MG/ML; UG/ML
INJECTION, SOLUTION EPIDURAL PRN
Status: DISCONTINUED | OUTPATIENT
Start: 2018-11-05 | End: 2018-11-05 | Stop reason: HOSPADM

## 2018-11-05 RX ADMIN — Medication 10 ML/HR: at 12:19

## 2018-11-05 RX ADMIN — LIDOCAINE HYDROCHLORIDE,EPINEPHRINE BITARTRATE 3 ML: 15; .005 INJECTION, SOLUTION EPIDURAL; INFILTRATION; INTRACAUDAL; PERINEURAL at 12:20

## 2018-11-05 RX ADMIN — IBUPROFEN 800 MG: 800 TABLET ORAL at 16:35

## 2018-11-05 RX ADMIN — Medication 10 ML/HR: at 12:20

## 2018-11-05 RX ADMIN — OXYTOCIN-SODIUM CHLORIDE 0.9% IV SOLN 30 UNIT/500ML 2 MILLI-UNITS/MIN: 30-0.9/5 SOLUTION at 10:08

## 2018-11-05 RX ADMIN — SODIUM CHLORIDE, POTASSIUM CHLORIDE, SODIUM LACTATE AND CALCIUM CHLORIDE: 600; 310; 30; 20 INJECTION, SOLUTION INTRAVENOUS at 10:09

## 2018-11-05 RX ADMIN — SENNOSIDES AND DOCUSATE SODIUM 1 TABLET: 8.6; 5 TABLET ORAL at 19:54

## 2018-11-05 NOTE — PLAN OF CARE
Problem: Patient Care Overview  Goal: Plan of Care/Patient Progress Review  Outcome: No Change  Labor Shift Note  Data: Contraction frequency q 1-5 minutes, palpate moderate. Fetal assessment category one.   Vitals:    18 1530 18 1938 18 2320 18 0325   BP:  112/78 107/70 100/56   Resp:    Temp: 98.9  F (37.2  C) 98.3  F (36.8  C) 98.4  F (36.9  C) 98.8  F (37.1  C)   TempSrc: Oral Oral Oral Oral   .  .  Patient feels that her water may have broken, however when cervical exam performed by provider unable to tell.  Signs and symptoms of infection absent.  Blood pressures WNL. Signs and symptoms of pre-eclampsia: absent, none and shortness of breath.  Support person  Rafael present.  Interventions: Continue uterine/fetal assessment continuous until further notice. Vital Signs per order set. Comfort measures reposition, birthing ball, hot packs and nitrous.  Medicated for none overnight.  Plan: Anticipate . Provide labor/coping assistance as needed by patient and support person.  Observe for and notify care provider of indications of progressing labor, need for pain medications, or signs of fetal/maternal compromise.

## 2018-11-05 NOTE — PROGRESS NOTES
[facility]  Labor Progress Note    S: Pt is resting next to bed.  Taking it contraction by contraction.    O:   Patient Vitals for the past 4 hrs:   BP Temp Temp src Resp   18 0325 100/56 98.8  F (37.1  C) Oral 18     SVE: 1.5/60/-3, soft, posterior ()    FHT: Baseline 135, moderate variability, present accelerations, two late decels,   [place]: 2 contractions in 10 minutes    A/P:  Ms. Barb Levy is a 28 year old  at 41w1d by LMP c/w 7w5d US, here for IOL for post-dates gestation.    - Labor induction: s/p PV miso x1 (1045), Cook in place with 80cc uterine, 80cc vaginal balloon at   - FWB: Category II FHT, however overall reassuring given return moderate variability, accels after this 10 m inute period.    - GBS negative  - Placenta posterior, EFW 7.5#, cephalic by BSUS    Amy Schumer, MD  Ob/Gyn, PGY-2  18

## 2018-11-05 NOTE — PROGRESS NOTES
[facility] PROGRESS NOTE:   2018 7:03 PM         SUBJECTIVE:   Patient reports feeling mild cramping  Contractions:  q 2-4 min  Leaking fluid:  no  Bleeding:  no  Pain:  mild           OBJECTIVE:     Vitals:    18 1053 18 1146 18 1500 18 1530   BP: 116/78 109/70 125/76    Resp:  16  18   Temp:  98.1  F (36.7  C)  98.9  F (37.2  C)   TempSrc:  Oral  Oral         NST:  FHT:  135, mod, + accels, no decels  Cat:   1  [place]:  q 2-4 min  SVE:  /-3         LABS:     Recent Results (from the past 12 hour(s))   CBC with platelets differential    Collection Time: 18 10:56 AM   Result Value Ref Range    WBC 7.8 4.0 - 11.0 10e9/L    RBC Count 3.85 3.8 - 5.2 10e12/L    Hemoglobin 11.6 (L) 11.7 - 15.7 g/dL    Hematocrit 36.0 35.0 - 47.0 %    MCV 94 78 - 100 fl    MCH 30.1 26.5 - 33.0 pg    MCHC 32.2 31.5 - 36.5 g/dL    RDW 13.2 10.0 - 15.0 %    Platelet Count 195 150 - 450 10e9/L    Diff Method Automated Method     % Neutrophils 69.2 %    % Lymphocytes 21.0 %    % Monocytes 8.0 %    % Eosinophils 1.2 %    % Basophils 0.3 %    % Immature Granulocytes 0.3 %    Nucleated RBCs 0 0 /100    Absolute Neutrophil 5.4 1.6 - 8.3 10e9/L    Absolute Lymphocytes 1.6 0.8 - 5.3 10e9/L    Absolute Monocytes 0.6 0.0 - 1.3 10e9/L    Absolute Eosinophils 0.1 0.0 - 0.7 10e9/L    Absolute Basophils 0.0 0.0 - 0.2 10e9/L    Abs Immature Granulocytes 0.0 0 - 0.4 10e9/L    Absolute Nucleated RBC 0.0    ABO/Rh type and screen    Collection Time: 18 10:56 AM   Result Value Ref Range    ABO A     RH(D) Pos     Antibody Screen Neg     Test Valid Only At          [facility]    Specimen Expires 2018               ASSESSMENT / PLAN:   28 year old  at 41w0d admitted for post-dates IOL.    Labor: s/p vaginal misoprostol x 1, then hasmukh too frequently for second dose.  Plan for Cook cervical ripening catheter when she's done  eating.  Fetal well being: Category 1 tracing.  GBS: neg  Pain: minimal    Amee Becerra MD      -------------  Personally placed cervical ripening balloon.  80cc placed in each the vaginal and uterine balloons.  Patient tolerated well.    Amee Becerra MD  8:36 PM

## 2018-11-05 NOTE — PLAN OF CARE
Problem: Patient Care Overview  Goal: Plan of Care/Patient Progress Review  Outcome: Improving  Pt complete at 1500 and did a couple practice pushes. Began actively pushing at 1530. Delivered at 1553. Placenta delivered at 1557. Vaginal laceration repaired. QBL 243ml. VSS. Fundus midline/firm/UU with scant bleeding. Ibuprofen given at 1635. Will continue to monitor.

## 2018-11-05 NOTE — PROGRESS NOTES
[facility] PROGRESS NOTE:   2018 6:21 AM          SUBJECTIVE:   Patient reports contractions have gotten more painful again.  Also seems to be leaking a small amount of clear fluid.  Standing at the bedside rocking through contractions.  Contractions:  q 1-4min  Leaking fluid:  Yes, clear  Bleeding:  no  Pain:  Coping well           OBJECTIVE:     Vitals:    18 1530 18 1938 18 2320 18 0325   BP:  112/78 107/70 100/56   Resp:    Temp: 98.9  F (37.2  C) 98.3  F (36.8  C) 98.4  F (36.9  C) 98.8  F (37.1  C)   TempSrc: Oral Oral Oral Oral         NST:  FHT:  135, mod variability, + accels, no decels  Cat:   1  [place]:  q 1-6 min  SVE:  4/70/-2, still very posterior         LABS:     No results found for this or any previous visit (from the past 12 hour(s)).           ASSESSMENT / PLAN:   28 year old  at 41w0d admitted for post-dates IOL.     Labor: s/p vaginal misoprostol x 1 and Cook cervical ripening balloon.  S/p SROM and hasmukh uncomfortably.    Fetal well being: Category 1 tracing.  GBS: neg  Pain: coping well with nitrous, bath.  Could have epidural, if requested     Amee Becerra MD

## 2018-11-05 NOTE — PROGRESS NOTES
[facility]  Labor Progress Note    S: Pt is feeling well. Had some dinner. Mild cramping.    O:   Patient Vitals for the past 4 hrs:   BP Temp Temp src Resp   18 1530 - 98.9  F (37.2  C) Oral 18   18 1500 125/76 - - -     SVE: 1.5/60/-3, soft, posterior    FHT: Baseline 135, moderate variability, present accelerations, no decelerations  [place]: 3-4 contractions in 10 minutes    A/P:  Ms. Barb Levy is a 28 year old  at 41w0d by LMP c/w 7w5d US, here for IOL for post-dates gestation.    - Labor induction: s/p PV miso x1 (1045), given closely spaced contractions attempted Cook catheter placement. Unable to place, Dr. Becerra to attempt.  - FWB: Category 1 FHT  - GBS negative  - Placenta posterior, EFW 7.5#, cephalic by BSUS    Amy Schumer, MD  Ob/Gyn, PGY-2  2018, 8:21 PM

## 2018-11-05 NOTE — PROGRESS NOTES
[facility] PROGRESS NOTE:   18 2:43 AM          SUBJECTIVE:   Patient reports contractions have gotten a little better in the last few hours.  Took a bath, which was helpful  Contractions:  q 1-4min  Leaking fluid:  no  Bleeding:  no  Pain:  Coping well           OBJECTIVE:     Vitals:    18 1500 18 1530 18 1938 18 2320   BP: 125/76  112/78 107/70   Resp:     Temp:  98.9  F (37.2  C) 98.3  F (36.8  C) 98.4  F (36.9  C)   TempSrc:  Oral Oral Oral         NST:  FHT:  140, mod variability, + accels, no decels  Cat:   1  [place]:  q 1.5-4 min  SVE:  deferred         LABS:     No results found for this or any previous visit (from the past 12 hour(s)).           ASSESSMENT / PLAN:   28 year old  at 41w0d admitted for post-dates IOL.     Labor: s/p vaginal misoprostol x 1, then yokasta too frequently for second dose.  Cook catheter now in place.  Yokasta irregularly.  Fetal well being: Category 1 tracing.  GBS: neg  Pain: coping well with nitrous, bath.  Could have epidural, if requested     Amee Becerra MD

## 2018-11-05 NOTE — PROGRESS NOTES
[facility]  Labor Progress Note    S: Pt is uncomfortable with contractions. Using nitrous.     O:   Patient Vitals for the past 4 hrs:   BP Temp Temp src Resp   18 2320 107/70 98.4  F (36.9  C) Oral 18     SVE: 1.5/60/-3, soft, posterior ()    FHT: Baseline 125, moderate variability, present accelerations, no decelerations  [place]: 4 contractions in 10 minutes    A/P:  Ms. Barb Levy is a 28 year old  at 41w1d by LMP c/w 7w5d US, here for IOL for post-dates gestation.    - Labor induction: s/p PV miso x1 (1045), Cook in place with 80cc uterine, 80cc vaginal balloon at   - FWB: Category 1 FHT  - GBS negative  - Placenta posterior, EFW 7.5#, cephalic by BSUS    Amy Schumer, MD  Ob/Gyn, PGY-2  18

## 2018-11-05 NOTE — ANESTHESIA PROCEDURE NOTES
Epidural Procedure Note    Staff:     Anesthesiologist:  ABIEL MERINO  Location: OB     Procedure start time:  11/5/2018 12:10 PM   Pre-procedure checklist:   patient identified, IV checked, site marked, risks and benefits discussed, informed consent, monitors and equipment checked, pre-op evaluation, at physician/surgeon's request and post-op pain management      Correct Patient: Yes      Correct Position: Yes      Correct Site: Yes      Correct Procedure: Yes      Correct Laterality:  Yes    Site Marked:  Yes  Procedure:     Procedure:  Epidural catheter    ASA:  2    Diagnosis:  Labor    Position:  Sitting    Sterile Prep: chloraprep      Insertion site:  L2-3    Local skin infiltration:  1% lidocaine    amount (mL):  2    Approach:  Midline    Needle gauge (G):  17    Needle Length (in):  3.5    Block Needle Type:  [place] a    Injection Technique:  LORT saline    HELENA at (cm):  3.5    Attempts:  1    Redirects:  0    Catheter gauge (G):  19    Catheter threaded easily: Yes      Threaded to cm at skin:  7    Threaded in epidural space (cm):  3.5    Paresthesias:  No    Aspiration negative for Heme or CSF: Yes      Test dose (mL):  3    Test dose time:  12:20    Test dose negative for signs of intravascular, subdural or intrathecal injection: Yes

## 2018-11-05 NOTE — PLAN OF CARE
Problem: Patient Care Overview  Goal: Plan of Care/Patient Progress Review  Outcome: Therapy, progress toward functional goals as expected  Unable to give additional misoprostol due to frequency of contractions. Attempt to place cook cath by Dr. Schumer. Dr. Becerra at bedside for successful cook cath placement at 2020: 80 ml in uterine balloon and 60 ml in vaginal. Patient using nitrous oxide for pain management and tolerated well. Bloody show present.  at bedside for support.

## 2018-11-05 NOTE — PROVIDER NOTIFICATION
11/05/18 0400   FHR   Monitor External US   Variability 6-25 BPM   Baseline Rate (Fetus A) 130 bpm   Baseline Classification Normal   Accelerations Present   Decelerations LD  (x2)   FHT look to be in sleep cycle- LD x2 contractions with minimal variability. Back to moderate variability with no decels for remainder of hour. Will continue to monitor closely. Dr. Schumer aware of decels.

## 2018-11-05 NOTE — PLAN OF CARE
Problem: Patient Care Overview  Goal: Plan of Care/Patient Progress Review  Outcome: Improving  Patient in complete and +2 with urge to push. 3 practice pushes with decent effort, but coaching/iunstuction needed. Variable decels noted, with good variability in between. Dr Medina notified of patient status, report given to Ynes Blackman RN.

## 2018-11-05 NOTE — PROVIDER NOTIFICATION
11/05/18 0607   Provider Notification   Provider Name/Title Dr. Schumer   Method of Notification Electronic Page   Request Evaluate in Person   Notification Reason Membrane Status   Patient seems to be leaking with cook catheter in place. Dr. Schumer called to assess patient for possible SROM and asked if MD would like to remove cook if SROM. Will continue to monitor closely. FHT reactive.

## 2018-11-05 NOTE — PROGRESS NOTES
[facility]  Labor Progress Note    S:  Patient laboring well. Contractions spacing out a little but still breathing through them.     O:   Patient Vitals for the past 4 hrs:   BP Temp Temp src Heart Rate Resp   18 0723 102/64 98.8  F (37.1  C) Oral 82 18     SVE: 4/70/-2, soft, posterior    FHT: Baseline 130, mod variability, + accelerations, no decelerations  [place]: 2 contractions in 10 minutes    A/P:  Ms. Barb Levy is a 28 year old  at 41w1d by LMP c/w 7w5d US, here for IOL for post-dates.    Labor: - S/p PV miso x1, Cook. Start pitocin now for augmentation.   FWB: - Category I FHT  PNC: - Rh pos, Rubella immune, GBS neg,  GTT 71/104/80/48, Placenta posterior    Le Mcelroy MD  Ob/Gyn, PGY-2  2018, 10:47 AM

## 2018-11-05 NOTE — PROVIDER NOTIFICATION
11/05/18 1205   Provider Notification   Provider Name/Title Jacob Lepe Anesthesiologist   Method of Notification At Bedside   Request Evaluate in Person   Notification Reason Pain

## 2018-11-05 NOTE — PLAN OF CARE
Problem: Patient Care Overview  Goal: Plan of Care/Patient Progress Review  Outcome: Improving  Data: Contractions 1.5-5, palpate moderate. Oxytocin at 8mu/hour. Fetal assessment category one. Resting comfortably with Epidural.   Interventions: Continuous uterine/fetal assessment. Vital Signs per order set.  Plan: Anticipate . Provide labor/coping assistance as needed by patient and support person.  Observe for and notify care provider of indications of progressing labor, need for pain medications,  or signs of fetal/maternal compromise.

## 2018-11-05 NOTE — PROGRESS NOTES
[facility] PROGRESS NOTE:   2018 10:40 PM         SUBJECTIVE:   Patient reports contractions getting more uncomfortable  Contractions:  q 2 min  Leaking fluid:  no  Bleeding:  no  Pain:  Increasing, but coping well with nitrous           OBJECTIVE:     Vitals:    18 1146 18 1500 18 1530 18 1938   BP: 109/70 125/76  112/78   Resp:     Temp: 98.1  F (36.7  C)  98.9  F (37.2  C) 98.3  F (36.8  C)   TempSrc: Oral  Oral Oral         NST:  FHT:  130, mod variability, + accels, no decels  Cat:   1  [place]:  q 2-3 min  SVE:  deferred         LABS:     Recent Results (from the past 12 hour(s))   CBC with platelets differential    Collection Time: 18 10:56 AM   Result Value Ref Range    WBC 7.8 4.0 - 11.0 10e9/L    RBC Count 3.85 3.8 - 5.2 10e12/L    Hemoglobin 11.6 (L) 11.7 - 15.7 g/dL    Hematocrit 36.0 35.0 - 47.0 %    MCV 94 78 - 100 fl    MCH 30.1 26.5 - 33.0 pg    MCHC 32.2 31.5 - 36.5 g/dL    RDW 13.2 10.0 - 15.0 %    Platelet Count 195 150 - 450 10e9/L    Diff Method Automated Method     % Neutrophils 69.2 %    % Lymphocytes 21.0 %    % Monocytes 8.0 %    % Eosinophils 1.2 %    % Basophils 0.3 %    % Immature Granulocytes 0.3 %    Nucleated RBCs 0 0 /100    Absolute Neutrophil 5.4 1.6 - 8.3 10e9/L    Absolute Lymphocytes 1.6 0.8 - 5.3 10e9/L    Absolute Monocytes 0.6 0.0 - 1.3 10e9/L    Absolute Eosinophils 0.1 0.0 - 0.7 10e9/L    Absolute Basophils 0.0 0.0 - 0.2 10e9/L    Abs Immature Granulocytes 0.0 0 - 0.4 10e9/L    Absolute Nucleated RBC 0.0    ABO/Rh type and screen    Collection Time: 18 10:56 AM   Result Value Ref Range    ABO A     RH(D) Pos     Antibody Screen Neg     Test Valid Only At          [facility]    Specimen Expires 2018               ASSESSMENT / PLAN:   28 year old  at 41w0d admitted for post-dates IOL.     Labor: s/p vaginal misoprostol x 1, then hasmukh too frequently  for second dose.  Cook catheter now in place.  Yokasta uncomfortably.    Fetal well being: Category 1 tracing.  GBS: neg  Pain: coping well with nitrous.  Could have epidural, if requested     Amee Becerra MD

## 2018-11-05 NOTE — PROGRESS NOTES
S; patient getting epidural, much more uncomfortable after pitocin started.    O: /53  Temp 97.8  F (36.6  C) (Oral)  Resp 16  LMP 01/21/2018  SpO2 95%    FHt: 125, +accels, -decels, mod susan  [place]: Q4-5    A/P; IOL at 41w1d   Fwbr, cat 1 tracing   Cont pit IOL    SHERRIE BARRON MD

## 2018-11-05 NOTE — PROVIDER NOTIFICATION
11/04/18 1800   Provider Notification   Provider Name/Title Dr. Knapp   Method of Notification Electronic Page   Request Evaluate in Person   Notification Reason Uterine Activity   Ctxs q 1-4 mins after 500 ml bolus. Provider requested to eval and advise for miso administration.

## 2018-11-05 NOTE — ANESTHESIA PREPROCEDURE EVALUATION
"Anesthesia Pre-Procedure Evaluation    Patient: Barb Levy   MRN:     [id number] Gender:   female   Age:    28 year old :      1990        Preoperative Diagnosis: * No pre-op diagnosis entered *   * No procedures listed *     History reviewed. No pertinent past medical history.   Past Surgical History:   Procedure Laterality Date     HC TOOTH EXTRACTION W/FORCEP      wisdom teeth          Anesthesia Evaluation       history and physical reviewed .      No history of anesthetic complications          ROS/MED HX    ENT/Pulmonary:  - neg pulmonary ROS     Neurologic:  - neg neurologic ROS     Cardiovascular:  - neg cardiovascular ROS       METS/Exercise Tolerance:     Hematologic:         Musculoskeletal:         GI/Hepatic:  - neg GI/hepatic ROS       Renal/Genitourinary:         Endo:         Psychiatric:         Infectious Disease:         Malignancy:         Other:                     [facility] AN PHYSICAL EXAM    Lab Results   Component Value Date    WBC 7.8 2018    HGB 11.6 (L) 2018    HCT 36.0 2018     2018       Preop Vitals  BP Readings from Last 3 Encounters:   18 125/73   10/31/18 114/75   10/24/18 117/75    Pulse Readings from Last 3 Encounters:   10/31/18 73   10/24/18 99   10/16/18 80      Resp Readings from Last 3 Encounters:   18 20    SpO2 Readings from Last 3 Encounters:   18 98%      Temp Readings from Last 1 Encounters:   18 36.7  C (98  F) (Oral)    Ht Readings from Last 1 Encounters:   18 1.74 m (5' 8.5\")      Wt Readings from Last 1 Encounters:   10/31/18 73.9 kg (163 lb)    Estimated body mass index is 24.42 kg/(m^2) as calculated from the following:    Height as of 18: 1.74 m (5' 8.5\").    Weight as of 10/31/18: 73.9 kg (163 lb).     LDA:  Peripheral IV 18 Left Upper forearm (Active)   Site Assessment WDL 2018  7:23 AM   Line Status Saline locked 2018  7:23 AM   Phlebitis Scale 0-->no symptoms " 11/5/2018  7:23 AM   Infiltration Scale 0 11/5/2018  7:23 AM   Number of days:1       Intrathecal/Epidural Catheter 11/05/18 (Active)   Number of days:0            DANAE FV AN PLAN NO PONV RULE    neg OB CHINYERE Lepe MD

## 2018-11-05 NOTE — PROVIDER NOTIFICATION
11/04/18 2220   Provider Notification   Provider Name/Title Dr. Schumer   Method of Notification Electronic Page   Request Evaluate in Person   Notification Reason Status Update   Provider updated regarding increase in intensity of contractions reported by patient and palpated by this nurse. Patient coping via nitrous, hot packs, position changes, and support of .

## 2018-11-05 NOTE — PROGRESS NOTES
S; managing ctx well, breathing with them.  Has used NO and had good relief.    O: /64  Temp 98.8  F (37.1  C) (Oral)  Resp 18  LMP 01/21/2018    FHT: 125, +accels, -decels, mod susan  Gramling: Q5min  SVE: 4/60/-2, very posterior    A/P; IUP at 41w1d, IOL   FWBR, cat 1 tracing   Will begin pit IOL    SHERRIE BARRON MD

## 2018-11-05 NOTE — PLAN OF CARE
Problem: Patient Care Overview  Goal: Plan of Care/Patient Progress Review  Outcome: Improving  Data: Contractions: Irregular, every 2-7 minutes. FHT's category one. Cerivix exam: 4, 60, -2, extremely posterior.   Interventions: Pitocin induction/augmentation orders obtained.    Plan: Start pitocin per orders. Anticipate . Notify provider of progressing labor, needs for pain medication, or maternal/fetal distress.

## 2018-11-06 PROCEDURE — 12000028 ZZH R&B OB UMMC

## 2018-11-06 PROCEDURE — 25000132 ZZH RX MED GY IP 250 OP 250 PS 637: Performed by: OBSTETRICS & GYNECOLOGY

## 2018-11-06 RX ORDER — AMOXICILLIN 250 MG
2 CAPSULE ORAL 2 TIMES DAILY PRN
Qty: 100 TABLET | Refills: 0 | COMMUNITY
Start: 2018-11-06

## 2018-11-06 RX ADMIN — IBUPROFEN 800 MG: 800 TABLET ORAL at 12:38

## 2018-11-06 RX ADMIN — IBUPROFEN 800 MG: 800 TABLET ORAL at 06:38

## 2018-11-06 RX ADMIN — IBUPROFEN 800 MG: 800 TABLET ORAL at 18:54

## 2018-11-06 RX ADMIN — IBUPROFEN 800 MG: 800 TABLET ORAL at 00:12

## 2018-11-06 RX ADMIN — ACETAMINOPHEN 650 MG: 325 TABLET, FILM COATED ORAL at 07:50

## 2018-11-06 RX ADMIN — ACETAMINOPHEN 650 MG: 325 TABLET, FILM COATED ORAL at 22:44

## 2018-11-06 RX ADMIN — SENNOSIDES AND DOCUSATE SODIUM 2 TABLET: 8.6; 5 TABLET ORAL at 20:32

## 2018-11-06 RX ADMIN — SENNOSIDES AND DOCUSATE SODIUM 1 TABLET: 8.6; 5 TABLET ORAL at 07:50

## 2018-11-06 RX ADMIN — ACETAMINOPHEN 650 MG: 325 TABLET, FILM COATED ORAL at 13:20

## 2018-11-06 NOTE — PROVIDER NOTIFICATION
Transfer note: Pt transferred from L&D to [facility] into room 7131 via w/c with baby in arms at about 1820. Accompanied by FOB, L&D RN, and other family members. Room and folder orientation reviewed. Videos assigned.

## 2018-11-06 NOTE — PLAN OF CARE
Problem: Patient Care Overview  Goal: Plan of Care/Patient Progress Review  Outcome: Improving  Data: Vital signs within normal limits. Postpartum checks within normal limits - see flow record. Patient eating and drinking normally. Patient able to empty bladder independently and is up ambulating. No apparent signs of infection. Patient performing self cares and is able to care for infant.  Action: Patient medicated during the shift for pain and cramping. See MAR. Patient reassessed within 1 hour after each medication and pain was improved - patient stated she was comfortable. Patient education done about breastfeeding. See flow record.  Response: Positive attachment behaviors observed with infant. Support persons present.   Plan: Anticipate discharge on 11/6/18.

## 2018-11-06 NOTE — PLAN OF CARE
Problem: Patient Care Overview  Goal: Plan of Care/Patient Progress Review  Outcome: Improving  VSS. AFebrile. Voided in L&D before transferred to [facility]. Peripad has small drng. Firm, midline at u/u. Denies pain at this time. FOB and other family in room, very supportive. Holding baby and attentive to her needs. Will continue to monitor.

## 2018-11-06 NOTE — DISCHARGE SUMMARY
[facility] Discharge Summary    Barb Levy MRN# [id number]   Age: 28 year old YOB: 1990     Date of Admission:  2018  Date of Discharge::  18  Admitting Physician:  Rachell Medina MD  Discharge Physician:  Shea Stern MD          Admission Diagnoses:   -IUP at 41w1d  -UTI in pregnancy  -late term gestation          Discharge Diagnosis:   -, now delivered  - same as above          Procedures:   Procedure(s): -  -Epidural          Medications Prior to Admission:     Prescriptions Prior to Admission   Medication Sig Dispense Refill Last Dose     ascorbic acid (VITAMIN C) 250 MG CHEW chewable tablet Take 250 mg by mouth daily   11/3/2018 at Unknown time     Docusate Sodium (COLACE PO)    Past Month at Unknown time     Prenatal Vit-Fe Fumarate-FA (PRENATAL MULTIVITAMIN PLUS IRON) 27-0.8 MG TABS per tablet Take 1 tablet by mouth daily   Past Week at Unknown time     order for DME Equipment being ordered: double electric breast pump 1 pump 0              Discharge Medications:        Review of your medicines      START taking       Dose / Directions    ibuprofen 800 MG tablet   Commonly known as:  ADVIL/MOTRIN        Dose:  800 mg   Take 1 tablet (800 mg) by mouth every 6 hours as needed for other (cramping)   Quantity:  60 tablet   Refills:  0       senna-docusate 8.6-50 MG per tablet   Commonly known as:  SENOKOT-S;PERICOLACE        Dose:  2 tablet   Take 2 tablets by mouth 2 times daily as needed for constipation   Quantity:  100 tablet   Refills:  0         CONTINUE these medicines which have NOT CHANGED       Dose / Directions    ascorbic acid 250 MG Chew chewable tablet   Commonly known as:  vitamin C        Dose:  250 mg   Take 250 mg by mouth daily   Refills:  0       COLACE PO        Refills:  0       order for DME   Used for:  Supervision of normal first pregnancy, antepartum        Equipment being ordered: double electric breast pump   Quantity:  1  pump   Refills:  0       prenatal multivitamin plus iron 27-0.8 MG Tabs per tablet        Dose:  1 tablet   Take 1 tablet by mouth daily   Refills:  0            Where to get your medicines      These medications were sent to [facility] [place], [place] - [address]  [address], [facility] [address]     Phone:  [phone]      ibuprofen 800 MG tablet         Some of these will need a paper prescription and others can be bought over the counter. Ask your nurse if you have questions.     You don't need a prescription for these medications      senna-docusate 8.6-50 MG per tablet                     Consultations:   None       Brief Admission History       Ms. Barb Levy is a 28 year old  at 41w0d by LMP c/w 7w5d US, who presents for IOL for post-dates. She notes intermittent contractions but no vaginal bleeding/discharge, loss of fluid. Good fetal movement. Cervix unfavorable in clinic on 10/31 (0/50/-3).         Brief Intrapartum Course:     Barb Levy was admitted for postdates IOL.  She was given miso and then a cook catheter was placed overnight .  Morning of  the cook came out and she was 4cm but very posterior.  She was hasmukh spontaneously, but made no cervical change over the next 3-4hours so pitocin was started.  She progressed to 6cm and had SROM, then progressed to complete/+2 station with urge to push within an hour. After a brief second stage, she pushed to deliver a viable female infant.  After delivery of the head, a loose nuchal cord was noted and anterior shoulder was already delivering through the cord.  The rest of the body delivered quickly.  The baby was vigorous and crying and placed on mom's chest.  After >60second delay, the cord was clamped and cut.     After cord blood was collected, the placenta delivered spontaneously with the help of maternal expulsive efforts.  It had a 3vc and appeared intact.  IV pitocin was  started and the fundus was firm with minimal lochia.     A small vaginal laceration was noted and was bleeding.  The hymenal ring had evulsed slightly, so that area was excised after verbal consent was obtained.  The area was infiltrated with 1% lidocaine and the was repaired with 3-0 vicryl.  It was hemostatic at the completion of the repair.     Apgars: 9,9.  QBL: 243.  Wt: [id number].  Mom and baby stable.           Hospital Course:   The patient's hospital course was unremarkable.  On discharge, her pain was well controlled. Vaginal bleeding is similar to peak menstrual flow.  Voiding without difficulty.  Ambulating well and tolerating a normal diet.  No fever.  Breastfeeding  well.  Infant is stable.  No bowel movement yet.  She was discharged on post-partum day #2. Desires condoms for birth control.           Discharge Instructions and Follow-Up:   Discharge diet: Regular   Discharge activity: Pelvic rest for 6 weeks including no sexual intercourse, tampons, or douching.    Discharge follow-up: Follow up with your primary OB for a routine postpartum visit in 6 weeks           Discharge Disposition:   Discharged to home in stable condition      Sara Curiel MD, MHS  Ob/Gyn PGY3  11/07/18       I, Shea Stern, saw and evaluated this patient prior to discharge.  I personally reviewed vital signs, medications and labs.  I agree with note above.     Shea Stern MD

## 2018-11-06 NOTE — PROGRESS NOTES
Post Partum Progress Note    Subjective:  Patient is doing well.  No complaints. Minimal lochia.  Pain controlled on pain meds, could use some Ibuprofen this AM.  Tolerating PO intake and ambulating without any issues.  Denies any fever, chills, chest pain, N/V,  Headache. Planning on breastfeeding.  Planning on condoms for birth control.    Objective:  Vitals:    18 1745 18 1830 18 2000 18 0009   BP: 118/70 114/65 115/68 114/73   Pulse:  63 90 75   Resp:    Temp: 97.8  F (36.6  C) 98  F (36.7  C) 98.3  F (36.8  C) 97.9  F (36.6  C)   TempSrc: Oral Oral Oral Oral   SpO2: 94% 96% 96%          General: NAD, appears generally well  Abd:  Soft, nontender, nondistended, fundus firm at approximately 2 cm below umbilicus  Ext:  Trace edema in bilateral LE      Assessment and Plan:  28 year old old  post-partum day 1 s/p  after IOL for postdates. AFVSS. Doing well without any complaints.    -Hgb 11.6 > . No AM hgb.  -Rh positive; no rhogam needed  -Rubella immune; no MMR needed  -Breastfeeding  -Condoms for birth control  -Plan to DC today       Nagi Alsop - MS3    I was present with the medical student who participated in the service and in the documentation of the note. I have verified the history and personally performed the physical exam and medical decision making. I agree with the assessment and plan of care as documented in the note.    Sara Curiel MD, MHS  Ob/Gyn PGY3  18    Physician Attestation   I, Amisha Allen, personally examined and evaluated this patient.  I discussed the patient with the medical student and/or resident and care team, and agree with the assessment and plan of care as documented in the note of 18  [date].      I personally reviewed vital signs, medications, labs and exam.    Key findings: Doing well. Learning breastfeeding. Questions answered.   Anticipate discharge tomorrow, possibly this evening.   Amisha  Livia Allen MD  Date of Service (when I saw the patient): 11/06/18

## 2018-11-06 NOTE — PLAN OF CARE
Data: Barb Levy transferred to 71 via wheelchair at 1815. Baby transferred via parent's arms.  Action: Receiving unit notified of transfer: Yes. Patient and family notified of room change. Report given to YVON Bess at bedside. Belongings sent to receiving unit. Accompanied by Registered Nurse. Oriented patient to surroundings. Call light within reach. ID bands double-checked with receiving RN.  Response: Patient tolerated transfer and is stable.

## 2018-11-07 VITALS
OXYGEN SATURATION: 96 % | RESPIRATION RATE: 16 BRPM | TEMPERATURE: 98 F | SYSTOLIC BLOOD PRESSURE: 112 MMHG | HEART RATE: 75 BPM | DIASTOLIC BLOOD PRESSURE: 80 MMHG

## 2018-11-07 PROCEDURE — 25000132 ZZH RX MED GY IP 250 OP 250 PS 637: Performed by: OBSTETRICS & GYNECOLOGY

## 2018-11-07 RX ADMIN — IBUPROFEN 800 MG: 800 TABLET ORAL at 06:52

## 2018-11-07 RX ADMIN — HYDROCORTISONE: 25 CREAM TOPICAL at 06:52

## 2018-11-07 RX ADMIN — ACETAMINOPHEN 650 MG: 325 TABLET, FILM COATED ORAL at 06:52

## 2018-11-07 RX ADMIN — IBUPROFEN 800 MG: 800 TABLET ORAL at 01:07

## 2018-11-07 RX ADMIN — SENNOSIDES AND DOCUSATE SODIUM 2 TABLET: 8.6; 5 TABLET ORAL at 09:56

## 2018-11-07 RX ADMIN — ACETAMINOPHEN 650 MG: 325 TABLET, FILM COATED ORAL at 02:51

## 2018-11-07 NOTE — PLAN OF CARE
Problem: Patient Care Overview  Goal: Plan of Care/Patient Progress Review  Outcome: Improving  Data: Vital signs within normal limits. Postpartum checks within normal limits - see flow record. Patient eating and drinking normally. Patient able to empty bladder independently and is up ambulating. No apparent signs of infection. Patient has hemorrhoids and is treating with hydrocortisone. Patient performing self cares and is able to care for infant. Breastfeeding is going well for patient and .   Action: Patient medicated during the shift for pain. See MAR. Patient reassessed within 1 hour after each medication and pain was improved - patient stated she was comfortable. Patient education done. See flow record.  Response: Positive attachment behaviors observed with infant. Support persons present which is  Scot.   Plan: Anticipate discharge on 18.

## 2018-11-07 NOTE — PROGRESS NOTES
Post Partum Progress Note    Subjective:  Patient is doing well this morning. Having trouble burping baby but feels breast feeding is going okay.  Minimal lochia.  Pain controlled on pain meds. Tolerating PO intake and ambulating without any issues.  Denies any fever, chills, chest pain, N/V,  Headache. Planning on condoms for birth control.    Objective:  Vitals:    18 0009 18 0752 18 1645 18 0100   BP: 114/73 108/77 120/79 113/83   Pulse: 75 75     Resp: 18 16 16 16   Temp: 97.9  F (36.6  C) 98.2  F (36.8  C) 98.1  F (36.7  C) 98.1  F (36.7  C)   TempSrc: Oral Oral Oral Oral   SpO2:           General: NAD, appears generally well  Abd:  Soft, nontender, nondistended, fundus firm at approximately U-0  Ext:  Trace edema in bilateral LE      Assessment and Plan:  28 year old old  post-partum day 2 s/p  after IOL for postdates. AFVSS. Doing well without any complaints.    -Hgb 11.6 > .  -Rh positive; no rhogam needed  -Rubella immune; no MMR needed  -Breastfeeding  -Condoms for birth control  -Plan to DC today         Sara Curiel MD, MHS  Ob/Gyn PGY3  18      Physician Attestation   I, Shea Stern, personally saw and evaluated Barb.  I have reviewed and discussed with the resident their discharge plan.    My key history or physical exam findings from the day of discharge: patient is voiding, tolerating a regular diet and passing gas.  Bleeding is appropriate and vitals are stable.     Key management decisions made by me: ok for d/c today.  Discussed postpartum instructions/restrictions and follow up.       Shea Stern MD  Date of Service (when I saw the patient): 2018

## 2018-11-07 NOTE — PLAN OF CARE
Problem: Patient Care Overview  Goal: Plan of Care/Patient Progress Review  Outcome: Adequate for Discharge Date Met: 11/07/18  Patient discharged to home with baby and . All discharge instructions reviewed and copy given to patient. Discharge meds given to patient prior to discharge.

## 2018-11-07 NOTE — PLAN OF CARE
Problem: Patient Care Overview  Goal: Plan of Care/Patient Progress Review  Outcome: Improving  [id number]. Stable postpartum mother. Breastfeeding independently. Pain managed with IB and tylenol. Heat applied for cramping. BC and ROP needed. No further concerns at this time. Significant other Rafael present at the bedside and supportive. Plan is for discharge tomorrow.

## 2018-11-07 NOTE — PLAN OF CARE
Problem: Patient Care Overview  Goal: Plan of Care/Patient Progress Review  Outcome: Improving  [id number]:  VSS and postpartum assessments WDL.  Up ad reginaldo with steady gait.  Independent with cares.  Bonding well with infant.  Breastfeeding on cue reportedly at beginning of shift and reported one attempt, encouraged to call with next feed/attempt.  Pain managed with ibuprofen per MAR and tucks.  , Rafael present and supportive.  EDS completed, score=4.  Will continue with postpartum cares and education per plan of care.

## 2018-11-08 ENCOUNTER — TELEPHONE (OUTPATIENT)
Dept: OBGYN | Facility: CLINIC | Age: 28
End: 2018-11-08

## 2018-11-08 NOTE — TELEPHONE ENCOUNTER
Patient calling regarding postpartum concern - having LUQ pain that is constant - underneath left ribcage. Rating a 5-6 out of 10. Feels like it is a dull ache with some sharper pains. No fever, aches or chills. Normal lochia. No UTI symptoms. Normal BMs - is more diarrhea like from stool softeners. Informed patient that I am unsure what could be causing the pain, but will send a message to on call MD for recommendations and call patient back.   Cami Partida

## 2018-11-09 ENCOUNTER — DOCUMENTATION ONLY (OUTPATIENT)
Dept: CARE COORDINATION | Facility: CLINIC | Age: 28
End: 2018-11-09

## 2018-11-09 NOTE — PROGRESS NOTES
[facility] will be sharing updates with you on Maternal Child Health Referral requests for home care services.  This is for care coordination purposes and alert you to referral status.  We received the referral for  Barb Levy; MRN [id number] and want to update you:    [facility] is unable to see patient for postpartum/  assessment and education due to patienst insurance  [employer]  is not contracted with [facility] for this service.  Patient advised to contact their insurance provider to determine if service is covered through another homecare agency. Offered option of private pay nurse assessment and education for mom or baby at service rate of 150.00 per visit or 180.00 for both.  Provided call back information if private pay visit is requested.      Sincerely [facility]  Aurora Rivas  [phone]

## 2018-12-17 ENCOUNTER — PRENATAL OFFICE VISIT (OUTPATIENT)
Dept: OBGYN | Facility: CLINIC | Age: 28
End: 2018-12-17
Payer: COMMERCIAL

## 2018-12-17 VITALS
WEIGHT: 139 LBS | TEMPERATURE: 97.2 F | DIASTOLIC BLOOD PRESSURE: 73 MMHG | BODY MASS INDEX: 20.83 KG/M2 | SYSTOLIC BLOOD PRESSURE: 119 MMHG | HEART RATE: 69 BPM

## 2018-12-17 DIAGNOSIS — Z12.4 CERVICAL CANCER SCREENING: ICD-10-CM

## 2018-12-17 DIAGNOSIS — K64.4 EXTERNAL HEMORRHOIDS: ICD-10-CM

## 2018-12-17 PROCEDURE — 99207 ZZC POST PARTUM EXAM: CPT | Performed by: OBSTETRICS & GYNECOLOGY

## 2018-12-17 PROCEDURE — 87624 HPV HI-RISK TYP POOLED RSLT: CPT | Performed by: OBSTETRICS & GYNECOLOGY

## 2018-12-17 PROCEDURE — G0145 SCR C/V CYTO,THINLAYER,RESCR: HCPCS | Performed by: OBSTETRICS & GYNECOLOGY

## 2018-12-17 ASSESSMENT — PATIENT HEALTH QUESTIONNAIRE - PHQ9: SUM OF ALL RESPONSES TO PHQ QUESTIONS 1-9: 5

## 2018-12-17 NOTE — PROGRESS NOTES
OB GYN CLINIC VISIT  2018    CC: postpartum visit    SUBJECTIVE:  Barb Levy is a 28 year old female  here for a postpartum visit.  She had a  on 18 delivering a healthy baby girl weighing 7 lbs 2 oz at 41w1d.      PHQ-9 SCORE 2018   PHQ-9 Total Score 5     No flowsheet data found.      delivery complications:  No  breast feeding:  Yes, no issues  bladder problems:  No  bowel problems/hemorrhoids:  Yes, hemorrhoids have continued.  They're just as bad as before delivery, if not a little worse.  Not constipated; has BM every other day, which is normal for her.  Topical treatments not helping anymore.  episiotomy/laceration/incision healed? Yes  vaginal flow:  None  [place]:  No  contraception:  condoms  emotional adjustment:  doing well and happy  back to work:  End of     Current Outpatient Medications   Medication     ascorbic acid (VITAMIN C) 250 MG CHEW chewable tablet     ibuprofen (ADVIL/MOTRIN) 800 MG tablet     order for DME     Prenatal Vit-Fe Fumarate-FA (PRENATAL MULTIVITAMIN PLUS IRON) 27-0.8 MG TABS per tablet     senna-docusate (SENOKOT-S;PERICOLACE) 8.6-50 MG per tablet     No current facility-administered medications for this visit.        OBJECTIVE:  Blood pressure 119/73, pulse 69, temperature 97.2  F (36.2  C), temperature source Oral, weight 63 kg (139 lb), last menstrual period 2018, unknown if currently breastfeeding.   General - pleasant female in no acute distress.  Breast - no nodularity, asymmetry or nipple discharge bilaterally.  Abdomen - soft, nontender, nondistended, no hepatosplenomegaly.  Pelvic - EG: normal adult female, BUS: within normal limits, Vagina: well rugated, no discharge, Cervix: normal, no lesions or CMT, pap smear obtained Uterus: firm, normal sized and nontender, Adnexae: no masses or tenderness.  Rectovaginal - deferred.    ASSESSMENT:  28 year old  with normal postpartum exam    PLAN:  Discussed kegel exercises    May resume normal activities without restrictions  Pap smear was  done today    The patient will use condoms for birth control. Full counseling was provided, and all questions answered. Compliance is strongly emphasized.    Placed referral for colorectal surgery for hemorrhoids.      Return to clinic in one year for an annual, when due for a pap smear or PRN.    Amee Becerra MD

## 2018-12-17 NOTE — NURSING NOTE
"Chief Complaint   Patient presents with     Post Partum Exam       Initial /73 (BP Location: Left arm, Patient Position: Sitting, Cuff Size: Adult Regular)   Pulse 69   Temp 97.2  F (36.2  C) (Oral)   Wt 63 kg (139 lb)   LMP 2018   BMI 20.83 kg/m   Estimated body mass index is 20.83 kg/m  as calculated from the following:    Height as of 18: 1.74 m (5' 8.5\").    Weight as of this encounter: 63 kg (139 lb).  BP completed using cuff size: regular    Questioned patient about current smoking habits.  Pt. has never smoked.          The following HM Due: NONE      The following patient reported/Care Every where data was sent to:  P ABSTRACT QUALITY INITIATIVES [[id number]]  ESTRELLITA Dowell           "

## 2018-12-17 NOTE — LETTER
December 24, 2018    Barb Levy  [address]  [facility] [address]    Dear Barb,  We are happy to inform you that your PAP smear result from 12/17/18 is normal.  We are now able to do a follow up test on PAP smears. The DNA test is for HPV (Human Papilloma Virus). Cervical cancer is closely linked with certain types of HPV. Your results showed no evidence of high risk HPV.  Therefore we recommend you return in 3 years for your next pap smear.  You will still need to return to the clinic every year for an annual exam and other preventive tests.  If you have additional questions regarding this result, please call our registered nurse, Kadie at [phone].  Sincerely,    Amee Becerra MD/[facility]

## 2018-12-20 LAB
COPATH REPORT: NORMAL
PAP: NORMAL

## 2018-12-21 LAB
FINAL DIAGNOSIS: NORMAL
HPV HR 12 DNA CVX QL NAA+PROBE: NEGATIVE
HPV16 DNA SPEC QL NAA+PROBE: NEGATIVE
HPV18 DNA SPEC QL NAA+PROBE: NEGATIVE
SPECIMEN DESCRIPTION: NORMAL
SPECIMEN SOURCE CVX/VAG CYTO: NORMAL

## 2019-02-19 ENCOUNTER — TELEPHONE (OUTPATIENT)
Dept: SURGERY | Facility: CLINIC | Age: 29
End: 2019-02-19

## 2019-02-19 NOTE — TELEPHONE ENCOUNTER
[facility] Call Center    Phone Message    May a detailed message be left on voicemail: yes    Reason for Call: Other: Pt is calling to schedule and appt for Hemorrhoids she is in  Pain. She rates her pain at an 8. Please call and discuss with Pt.      Action Taken: Message routed to:  [facility] Colon and rectal

## 2019-02-19 NOTE — TELEPHONE ENCOUNTER
Patient was called to assess her symptoms in regard to the below message. The direct phone number was unable to be left due to her voicemail box being full.     YVON Rodriguez Care Coordinator  [facility]  Phone: [phone]

## 2019-02-21 NOTE — TELEPHONE ENCOUNTER
Patient was called to assess her symptoms in regard to the below message. The direct phone number was unable to be left as the voicemail box was full.    YVON Rodriguez Care Coordinator  [facility]  Phone: [phone]

## 2019-03-28 NOTE — TELEPHONE ENCOUNTER
FUTURE VISIT INFORMATION      FUTURE VISIT INFORMATION:    Date: 4/17/19    Time: 5:15pm    [place] [facility] [place]:    Referring provider:  Dr. Amee Becerra    Referring providers clinic:  [facility]    Reason for visit/diagnosis:  External Hemorrhoids    NOTES STATUS DETAILS   OFFICE NOTE from referring provider  Internal 12/17/18, 10/2/18, 9/20/18, 9/4/18   OFFICE NOTE from other specialist   Internal 8/24/18 (Telephone)   DISCHARGE SUMMARY FROM HOSPITAL N/A    DISCHARGE REPORT FROM ED N/A    OPERATIVE REPORT  N/A    PFC REPORT N/A    MEDICATION LIST Internal    LABS     FIT/STOOL TESTING N/A    ANAL PAP N/A    PATHOLOGY REPORTS RELATED TO DIAGNOSIS N/A    DIAGNOSTIC PROCEDURES     COLONOSCOPY N/A    ENDOSCOPY (EGD) N/A    ERCP N/A    ANOSCOPY N/A    FLEX SIGMOIDOSCOPY N/A    IMAGING & REPORT      CT, MRI, US, XR N/A

## 2019-04-15 ENCOUNTER — TELEPHONE (OUTPATIENT)
Dept: SURGERY | Facility: CLINIC | Age: 29
End: 2019-04-15

## 2019-04-15 ASSESSMENT — ENCOUNTER SYMPTOMS
SORE THROAT: 0
TROUBLE SWALLOWING: 0
DECREASED APPETITE: 0
BLOATING: 0
NIGHT SWEATS: 0
VOMITING: 0
JAUNDICE: 0
POLYPHAGIA: 0
ALTERED TEMPERATURE REGULATION: 1
RECTAL PAIN: 1
WEIGHT GAIN: 0
NAUSEA: 1
NECK MASS: 0
ABDOMINAL PAIN: 0
SINUS PAIN: 0
CHILLS: 1
BLOOD IN STOOL: 1
BREAST PAIN: 0
HOARSE VOICE: 0
HALLUCINATIONS: 0
DIARRHEA: 0
POLYDIPSIA: 0
FATIGUE: 1
HEARTBURN: 0
BOWEL INCONTINENCE: 0
CONSTIPATION: 1
SINUS CONGESTION: 1
INCREASED ENERGY: 0
WEIGHT LOSS: 0
BREAST MASS: 1
TASTE DISTURBANCE: 0
FEVER: 1
SMELL DISTURBANCE: 0

## 2019-04-15 NOTE — TELEPHONE ENCOUNTER
Called patient to verify appointment date, time, and location. Patient did not answer phone call. A message was left regarding details of the future appointment. A callback number was left if cancellation is needed.   Blake Aldana, EMT

## 2019-04-17 ENCOUNTER — PRE VISIT (OUTPATIENT)
Dept: SURGERY | Facility: CLINIC | Age: 29
End: 2019-04-17

## 2019-04-17 ENCOUNTER — OFFICE VISIT (OUTPATIENT)
Dept: SURGERY | Facility: CLINIC | Age: 29
End: 2019-04-17
Payer: COMMERCIAL

## 2019-04-17 VITALS
BODY MASS INDEX: 20.73 KG/M2 | DIASTOLIC BLOOD PRESSURE: 63 MMHG | HEIGHT: 68 IN | WEIGHT: 136.8 LBS | OXYGEN SATURATION: 100 % | SYSTOLIC BLOOD PRESSURE: 107 MMHG | HEART RATE: 59 BPM

## 2019-04-17 DIAGNOSIS — K60.2 ANAL FISSURE: Primary | ICD-10-CM

## 2019-04-17 ASSESSMENT — PAIN SCALES - GENERAL: PAINLEVEL: NO PAIN (0)

## 2019-04-17 ASSESSMENT — MIFFLIN-ST. JEOR: SCORE: 1399.02

## 2019-04-17 NOTE — NURSING NOTE
"Chief Complaint   Patient presents with     Rectal Problem     External hemorrhoids.       Vitals:    04/17/19 1720   BP: 107/63   BP Location: Left arm   Patient Position: Sitting   Cuff Size: Adult Regular   Pulse: 59   SpO2: 100%   Weight: 136 lb 12.8 oz   Height: 5' 8\"       Body mass index is 20.8 kg/m .      Blake Aldana, EMT                      "

## 2019-04-17 NOTE — LETTER
"2019       RE: Barb Levy  [address]  [facility] [address]     Dear Colleague,    Thank you for referring your patient, Barb Levy, to the [facility] COLON AND RECTAL SURGERY at [facility]. Please see a copy of my visit note below.    Colon and Rectal Surgery Consult Clinic Note    Date: 2019     Referring provider:  Amee Becerra MD  [address] [place] [address]     RE: Barb Levy  : 1990  BREANNE: 2019    Barb Levy is a very pleasant 28 year old female without a significant past medical history with a recent diagnosis of hemorrhoids.  Given these findings they were subsequently sent to the Colon and Rectal Surgery Clinic for an opinion on this and a new patient consultation.     Rosina developed constipation during her pregnancy last year and had significant hemorrhoids at that time.  Had some issues with hard bowel movements.  She is taking a stool softener twice a day and intermittent fiber tablets.  She has been using hemorrhoid suppositories but continues to have a sharp pain and bright red blood with bowel movements.  She additionally feels as though there is some tissue that falls from the inside to the outside but does not need to be manually reduced.  She denies any fecal incontinence.  She thinks her grandfather had some sort of cancer and had an ostomy but she does not know colon cancer.  She has a colonoscopy.  She is otherwise healthy    Physical Examination:  /63 (BP Location: Left arm, Patient Position: Sitting, Cuff Size: Adult Regular)   Pulse 59   Ht 5' 8\"   Wt 136 lb 12.8 oz   SpO2 100%   BMI 20.80 kg/m     General: Alert, oriented, in no acute distress, sitting comfortably  HEENT: Mucous membranes moist    Perianal external examination: Exam was chaperoned by ISABELLA Orlando  Perianal skin: Intact with no excoriation or " lichenification.  Lesions: No evidence of an external lesion, nodularity, or induration in the perianal region.  Eversion of buttocks: There was evidence of an anal fissure. Details: Superficial posterior midline anal fissure with active bleeding.  Skin tags or external hemorrhoids: Yes: Skin tags in the anterior and posterior midline.  Digital rectal examination: Was deferred in order not to cause further trauma    Anoscopy: Was deferred in order not to cause further trauma    Assessment/Plan: 28 year old female with constipation and anal fissure. I discussed that this is likely the source of his pain and bleeding. Discussed the importance of keeping stools soft and easy to pass while healing fissure.  Recommended starting on a daily fiber supplement and can add in a laxative in addition as needed.  Will treat with topical diltiazem with follow up in 8 weeks. Discussed that it may take several weeks for symptoms to improve. If no improvement is noted after 4 weeks, return to clinic and discuss further intervention such as Botox injections.  Advised the use of Tylenol, ibuprofen, and warm tub baths for any pain.  If bleeding persist despite treatment of fissure, will have her return to clinic to assess for possible internal hemorrhoids. Patient's questions were answered to her stated satisfaction and she is in agreement with this plan.    Medical history:  No past medical history on file.    Surgical history:  Past Surgical History:   Procedure Laterality Date     HC TOOTH EXTRACTION W/FORCEP      wisdom teeth       Problem list:  Patient Active Problem List    Diagnosis Date Noted      (normal spontaneous vaginal delivery) 2018     Priority: Medium     Pregnancy 2018     Priority: Medium     Supervision of normal first pregnancy, antepartum 2018     Priority: Medium     Abnormal US finding-HC lagging--NORMAL F/U 2018     Priority: Medium     18:  EFW 10%ile.  HC 4%ile.  Remainder US  "normal.  Rec 4w f/u  7/23/18:  Normal US with MFM.  F/u as CI.       Urinary tract infection in pregnancy, antepartum 07/11/2018     Priority: Medium       Medications:  Current Outpatient Medications   Medication Sig Dispense Refill     ascorbic acid (VITAMIN C) 250 MG CHEW chewable tablet Take 250 mg by mouth daily       ibuprofen (ADVIL/MOTRIN) 800 MG tablet Take 1 tablet (800 mg) by mouth every 6 hours as needed for other (cramping) 60 tablet 0     order for DME Equipment being ordered: double electric breast pump 1 pump 0     Prenatal Vit-Fe Fumarate-FA (PRENATAL MULTIVITAMIN PLUS IRON) 27-0.8 MG TABS per tablet Take 1 tablet by mouth daily       senna-docusate (SENOKOT-S;PERICOLACE) 8.6-50 MG per tablet Take 2 tablets by mouth 2 times daily as needed for constipation 100 tablet 0       Allergies:  Allergies   Allergen Reactions     Wasps [Hornets] Swelling       Family history:  Family History   Problem Relation Age of Onset     Melanoma Father      Colon Cancer Maternal Grandfather      Heart Disease Maternal Grandfather      Osteoporosis Paternal Grandmother      Bladder Cancer Paternal Grandfather        Social history:  Social History     Tobacco Use     Smoking status: Never Smoker     Smokeless tobacco: Never Used   Substance Use Topics     Alcohol use: No    Marital status: .  Occupation: program analysis.    Nursing Notes:   Blake Aldana EMT  4/17/2019  5:21 PM  Sign at exiting of workspace  Chief Complaint   Patient presents with     Rectal Problem     External hemorrhoids.       Vitals:    04/17/19 1720   BP: 107/63   BP Location: Left arm   Patient Position: Sitting   Cuff Size: Adult Regular   Pulse: 59   SpO2: 100%   Weight: 136 lb 12.8 oz   Height: 5' 8\"       Body mass index is 20.8 kg/m .      ISABELLA Orlando       Total face to face time was 20 minutes, >50% counseling.    This note was created using speech recognition software and may contain unintended word " substitutions.      Again, thank you for allowing me to participate in the care of your patient.      Sincerely,    JOSE Blair CNP

## 2019-04-17 NOTE — PROGRESS NOTES
"Colon and Rectal Surgery Consult Clinic Note    Date: 2019     Referring provider:  Amee Becerra MD  [address] [place] [id number]     RE: Barb Levy  : 1990  BREANNE: 2019    Barb Levy is a very pleasant 28 year old female without a significant past medical history with a recent diagnosis of hemorrhoids.  Given these findings they were subsequently sent to the Colon and Rectal Surgery Clinic for an opinion on this and a new patient consultation.     Rosina developed constipation during her pregnancy last year and had significant hemorrhoids at that time.  Had some issues with hard bowel movements.  She is taking a stool softener twice a day and intermittent fiber tablets.  She has been using hemorrhoid suppositories but continues to have a sharp pain and bright red blood with bowel movements.  She additionally feels as though there is some tissue that falls from the inside to the outside but does not need to be manually reduced.  She denies any fecal incontinence.  She thinks her grandfather had some sort of cancer and had an ostomy but she does not know colon cancer.  She has a colonoscopy.  She is otherwise healthy    Physical Examination:  /63 (BP Location: Left arm, Patient Position: Sitting, Cuff Size: Adult Regular)   Pulse 59   Ht 5' 8\"   Wt 136 lb 12.8 oz   SpO2 100%   BMI 20.80 kg/m    General: Alert, oriented, in no acute distress, sitting comfortably  HEENT: Mucous membranes moist    Perianal external examination: Exam was chaperoned by ISABELLA Orlando  Perianal skin: Intact with no excoriation or lichenification.  Lesions: No evidence of an external lesion, nodularity, or induration in the perianal region.  Eversion of buttocks: There was evidence of an anal fissure. Details: Superficial posterior midline anal fissure with active bleeding.  Skin tags or external hemorrhoids: Yes: Skin tags in the anterior and posterior " midline.  Digital rectal examination: Was deferred in order not to cause further trauma    Anoscopy: Was deferred in order not to cause further trauma    Assessment/Plan: 28 year old female with constipation and anal fissure. I discussed that this is likely the source of his pain and bleeding. Discussed the importance of keeping stools soft and easy to pass while healing fissure.  Recommended starting on a daily fiber supplement and can add in a laxative in addition as needed.  Will treat with topical diltiazem with follow up in 8 weeks. Discussed that it may take several weeks for symptoms to improve. If no improvement is noted after 4 weeks, return to clinic and discuss further intervention such as Botox injections.  Advised the use of Tylenol, ibuprofen, and warm tub baths for any pain.  If bleeding persist despite treatment of fissure, will have her return to clinic to assess for possible internal hemorrhoids. Patient's questions were answered to her stated satisfaction and she is in agreement with this plan.    Medical history:  No past medical history on file.    Surgical history:  Past Surgical History:   Procedure Laterality Date     HC TOOTH EXTRACTION W/FORCEP      wisdom teeth       Problem list:  Patient Active Problem List    Diagnosis Date Noted      (normal spontaneous vaginal delivery) 2018     Priority: Medium     Pregnancy 2018     Priority: Medium     Supervision of normal first pregnancy, antepartum 2018     Priority: Medium     Abnormal US finding-HC lagging--NORMAL F/U 2018     Priority: Medium     18:  EFW 10%ile.  HC 4%ile.  Remainder US normal.  Rec 4w f/u  18:  Normal US with MFM.  F/u as CI.       Urinary tract infection in pregnancy, antepartum 2018     Priority: Medium       Medications:  Current Outpatient Medications   Medication Sig Dispense Refill     ascorbic acid (VITAMIN C) 250 MG CHEW chewable tablet Take 250 mg by mouth daily        "ibuprofen (ADVIL/MOTRIN) 800 MG tablet Take 1 tablet (800 mg) by mouth every 6 hours as needed for other (cramping) 60 tablet 0     order for DME Equipment being ordered: double electric breast pump 1 pump 0     Prenatal Vit-Fe Fumarate-FA (PRENATAL MULTIVITAMIN PLUS IRON) 27-0.8 MG TABS per tablet Take 1 tablet by mouth daily       senna-docusate (SENOKOT-S;PERICOLACE) 8.6-50 MG per tablet Take 2 tablets by mouth 2 times daily as needed for constipation 100 tablet 0       Allergies:  Allergies   Allergen Reactions     Wasps [Hornets] Swelling       Family history:  Family History   Problem Relation Age of Onset     Melanoma Father      Colon Cancer Maternal Grandfather      Heart Disease Maternal Grandfather      Osteoporosis Paternal Grandmother      Bladder Cancer Paternal Grandfather        Social history:  Social History     Tobacco Use     Smoking status: Never Smoker     Smokeless tobacco: Never Used   Substance Use Topics     Alcohol use: No    Marital status: .  Occupation: program analysis.    Nursing Notes:   Blake Aldana EMT  4/17/2019  5:21 PM  Sign at exiting of workspace  Chief Complaint   Patient presents with     Rectal Problem     External hemorrhoids.       Vitals:    04/17/19 1720   BP: 107/63   BP Location: Left arm   Patient Position: Sitting   Cuff Size: Adult Regular   Pulse: 59   SpO2: 100%   Weight: 136 lb 12.8 oz   Height: 5' 8\"       Body mass index is 20.8 kg/m .      ISABELLA Orlando                         Total face to face time was 20 minutes, >50% counseling.    JOSE Begum, NP-C  Colon and Rectal Surgery   [facility]    This note was created using speech recognition software and may contain unintended word substitutions.    "

## 2019-05-22 ENCOUNTER — OFFICE VISIT (OUTPATIENT)
Dept: URGENT CARE | Facility: URGENT CARE | Age: 29
End: 2019-05-22
Payer: COMMERCIAL

## 2019-05-22 VITALS
WEIGHT: 130.8 LBS | TEMPERATURE: 98.1 F | BODY MASS INDEX: 19.89 KG/M2 | OXYGEN SATURATION: 96 % | SYSTOLIC BLOOD PRESSURE: 102 MMHG | DIASTOLIC BLOOD PRESSURE: 66 MMHG | HEART RATE: 77 BPM

## 2019-05-22 DIAGNOSIS — R07.0 THROAT PAIN: ICD-10-CM

## 2019-05-22 DIAGNOSIS — R50.9 FEVER AND CHILLS: ICD-10-CM

## 2019-05-22 DIAGNOSIS — R11.2 NAUSEA AND VOMITING, INTRACTABILITY OF VOMITING NOT SPECIFIED, UNSPECIFIED VOMITING TYPE: Primary | ICD-10-CM

## 2019-05-22 LAB
ALBUMIN SERPL-MCNC: 3.7 G/DL (ref 3.4–5)
ALBUMIN UR-MCNC: 30 MG/DL
ALP SERPL-CCNC: 52 U/L (ref 40–150)
ALT SERPL W P-5'-P-CCNC: 16 U/L (ref 0–50)
AMYLASE SERPL-CCNC: 31 U/L (ref 30–110)
ANION GAP SERPL CALCULATED.3IONS-SCNC: 6 MMOL/L (ref 3–14)
APPEARANCE UR: ABNORMAL
AST SERPL W P-5'-P-CCNC: 20 U/L (ref 0–45)
BACTERIA #/AREA URNS HPF: ABNORMAL /HPF
BASOPHILS # BLD AUTO: 0 10E9/L (ref 0–0.2)
BASOPHILS NFR BLD AUTO: 0.5 %
BILIRUB SERPL-MCNC: 0.9 MG/DL (ref 0.2–1.3)
BILIRUB UR QL STRIP: ABNORMAL
BUN SERPL-MCNC: 17 MG/DL (ref 7–30)
CALCIUM SERPL-MCNC: 9.2 MG/DL (ref 8.5–10.1)
CHLORIDE SERPL-SCNC: 106 MMOL/L (ref 94–109)
CO2 SERPL-SCNC: 29 MMOL/L (ref 20–32)
COLOR UR AUTO: YELLOW
CREAT SERPL-MCNC: 0.8 MG/DL (ref 0.52–1.04)
DEPRECATED S PYO AG THROAT QL EIA: NORMAL
DIFFERENTIAL METHOD BLD: NORMAL
EOSINOPHIL # BLD AUTO: 0.1 10E9/L (ref 0–0.7)
EOSINOPHIL NFR BLD AUTO: 0.8 %
ERYTHROCYTE [DISTWIDTH] IN BLOOD BY AUTOMATED COUNT: 11.4 % (ref 10–15)
GFR SERPL CREATININE-BSD FRML MDRD: >90 ML/MIN/{1.73_M2}
GLUCOSE SERPL-MCNC: 130 MG/DL (ref 70–99)
GLUCOSE UR STRIP-MCNC: NEGATIVE MG/DL
HCT VFR BLD AUTO: 39.6 % (ref 35–47)
HGB BLD-MCNC: 13.6 G/DL (ref 11.7–15.7)
HGB UR QL STRIP: ABNORMAL
KETONES UR STRIP-MCNC: >=80 MG/DL
LEUKOCYTE ESTERASE UR QL STRIP: NEGATIVE
LIPASE SERPL-CCNC: 82 U/L (ref 73–393)
LYMPHOCYTES # BLD AUTO: 1.7 10E9/L (ref 0.8–5.3)
LYMPHOCYTES NFR BLD AUTO: 28.5 %
MCH RBC QN AUTO: 31.4 PG (ref 26.5–33)
MCHC RBC AUTO-ENTMCNC: 34.3 G/DL (ref 31.5–36.5)
MCV RBC AUTO: 92 FL (ref 78–100)
MONOCYTES # BLD AUTO: 0.4 10E9/L (ref 0–1.3)
MONOCYTES NFR BLD AUTO: 7.1 %
MUCOUS THREADS #/AREA URNS LPF: PRESENT /LPF
NEUTROPHILS # BLD AUTO: 3.7 10E9/L (ref 1.6–8.3)
NEUTROPHILS NFR BLD AUTO: 63.1 %
NITRATE UR QL: NEGATIVE
NON-SQ EPI CELLS #/AREA URNS LPF: ABNORMAL /LPF
PH UR STRIP: 6 PH (ref 5–7)
PLATELET # BLD AUTO: 233 10E9/L (ref 150–450)
POTASSIUM SERPL-SCNC: 4.3 MMOL/L (ref 3.4–5.3)
PROT SERPL-MCNC: 7.4 G/DL (ref 6.8–8.8)
RBC # BLD AUTO: 4.33 10E12/L (ref 3.8–5.2)
RBC #/AREA URNS AUTO: ABNORMAL /HPF
SODIUM SERPL-SCNC: 141 MMOL/L (ref 133–144)
SOURCE: ABNORMAL
SP GR UR STRIP: >1.03 (ref 1–1.03)
SPECIMEN SOURCE: NORMAL
UROBILINOGEN UR STRIP-ACNC: 0.2 EU/DL (ref 0.2–1)
WBC # BLD AUTO: 5.9 10E9/L (ref 4–11)
WBC #/AREA URNS AUTO: ABNORMAL /HPF

## 2019-05-22 PROCEDURE — 36415 COLL VENOUS BLD VENIPUNCTURE: CPT | Performed by: PHYSICIAN ASSISTANT

## 2019-05-22 PROCEDURE — 82150 ASSAY OF AMYLASE: CPT | Performed by: PHYSICIAN ASSISTANT

## 2019-05-22 PROCEDURE — 85025 COMPLETE CBC W/AUTO DIFF WBC: CPT | Performed by: PHYSICIAN ASSISTANT

## 2019-05-22 PROCEDURE — 87880 STREP A ASSAY W/OPTIC: CPT | Performed by: PHYSICIAN ASSISTANT

## 2019-05-22 PROCEDURE — 99214 OFFICE O/P EST MOD 30 MIN: CPT | Performed by: PHYSICIAN ASSISTANT

## 2019-05-22 PROCEDURE — 80053 COMPREHEN METABOLIC PANEL: CPT | Performed by: PHYSICIAN ASSISTANT

## 2019-05-22 PROCEDURE — 81001 URINALYSIS AUTO W/SCOPE: CPT | Performed by: PHYSICIAN ASSISTANT

## 2019-05-22 PROCEDURE — 87081 CULTURE SCREEN ONLY: CPT | Performed by: PHYSICIAN ASSISTANT

## 2019-05-22 PROCEDURE — 83690 ASSAY OF LIPASE: CPT | Performed by: PHYSICIAN ASSISTANT

## 2019-05-22 RX ORDER — ONDANSETRON 4 MG/1
4 TABLET, ORALLY DISINTEGRATING ORAL EVERY 8 HOURS PRN
Qty: 15 TABLET | Refills: 0 | Status: SHIPPED | OUTPATIENT
Start: 2019-05-22

## 2019-05-22 NOTE — LETTER
[place] [facility]  [address] [address]  [phone]      May 22, 2019    RE:  Barb Levy                                                                                                                                                       [facility] [facility] [address]            To whom it may concern:    Barb Levy was seen in clinic today for illness.        Sincerely,        Lisa Francis    [facility]

## 2019-05-22 NOTE — PATIENT INSTRUCTIONS
(R11.2) Nausea and vomiting, intractability of vomiting not specified, unspecified vomiting type  (primary encounter diagnosis)  Comment:   Plan: Comprehensive metabolic panel (BMP + Alb, Alk         Phos, ALT, AST, Total. Bili, TP), Lipase,         Amylase, ondansetron (ZOFRAN-ODT) 4 MG ODT tab          Clear liquids, slowly, then advance diet to bland diet and then to normal diet as tolerated    (R07.0) Throat pain  Comment:   Plan: Rapid strep screen, Beta strep group A culture            (R50.9) Fever and chills  Comment:   Plan: CBC with platelets and differential, UA with         Microscopic reflex to Culture            Follow up with primary clinic should symptoms persist, to ED should symptoms worsen.

## 2019-05-22 NOTE — PROGRESS NOTES
Patient presents with:  Urgent Care  Pharyngitis: Pt states vomiting,  fever, throat hurts less, fatigue, abdominal pain, chills sxs 2x days Thrush on 6x months daughter and breastfeeding       SUBJECTIVE:   Barb Levy is a 28 year old female presenting with a chief complaint of   1) sore throat 4 days  2) low grade fevers  3) nausea started 2 days ago.  Emesis x 2.    No diarrhea.    No ill contacts.    No runny nose or congestion or sneezing.    No significant cough.   4) epigastric discomfort 5/10 on a 0-10 pain scale.    Fatigue.  5) body aches  Onset of symptoms was as above.  Course of illness is worsening.    Severity moderate  Current and Associated symptoms: as above.    Treatment measures tried include Tylenol/Ibuprofen.  Emesis after taking tylenol.  Predisposing factors include None.    Her 6 month old has thrush.  She is nursing.      No past medical history on file.  Patient Active Problem List   Diagnosis     Abnormal US finding-HC lagging--NORMAL F/U     Urinary tract infection in pregnancy, antepartum     Supervision of normal first pregnancy, antepartum     Pregnancy      (normal spontaneous vaginal delivery)     Social History     Tobacco Use     Smoking status: Never Smoker     Smokeless tobacco: Never Used   Substance Use Topics     Alcohol use: No       ROS:  CONSTITUTIONAL:as per HPI  INTEGUMENTARY/SKIN: NEGATIVE for worrisome rashes, moles or lesions  EYES: NEGATIVE for vision changes or irritation  ENT/MOUTH: as per HPI  RESP:as per HPI  CV: NEGATIVE for chest pain, palpitations or peripheral edema  GI: as per HPI  MUSCULOSKELETAL: NEGATIVE for significant arthralgias or myalgia  NEURO: NEGATIVE for weakness, dizziness or paresthesias  Review of systems negative except as stated above.    OBJECTIVE  :/66   Pulse 77   Temp 98.1  F (36.7  C) (Oral)   Wt 59.3 kg (130 lb 12.8 oz)   SpO2 96%   BMI 19.89 kg/m    GENERAL APPEARANCE: healthy, alert and no distress  EYES:  EOMI,  PERRL, conjunctiva clear  HENT: ear canals and TM's normal.  Nose and mouth without ulcers, erythema or lesions  NECK: supple, nontender, no lymphadenopathy  RESP: lungs clear to auscultation - no rales, rhonchi or wheezes  CV: regular rates and rhythm, normal S1 S2, no murmur noted  ABDOMEN:  soft, mild epigastric tenderness, no HSM or masses and bowel sounds normal  NEURO: Normal strength and tone, sensory exam grossly normal,  normal speech and mentation  SKIN: no suspicious lesions or rashes    Results for orders placed or performed in visit on 05/22/19   Comprehensive metabolic panel (BMP + Alb, Alk Phos, ALT, AST, Total. Bili, TP)   Result Value Ref Range    Sodium 141 133 - 144 mmol/L    Potassium 4.3 3.4 - 5.3 mmol/L    Chloride 106 94 - 109 mmol/L    Carbon Dioxide 29 20 - 32 mmol/L    Anion Gap 6 3 - 14 mmol/L    Glucose 130 (H) 70 - 99 mg/dL    Urea Nitrogen 17 7 - 30 mg/dL    Creatinine 0.80 0.52 - 1.04 mg/dL    GFR Estimate >90 >60 mL/min/[1.73_m2]    GFR Estimate If Black >90 >60 mL/min/[1.73_m2]    Calcium 9.2 8.5 - 10.1 mg/dL    Bilirubin Total 0.9 0.2 - 1.3 mg/dL    Albumin 3.7 3.4 - 5.0 g/dL    Protein Total 7.4 6.8 - 8.8 g/dL    Alkaline Phosphatase 52 40 - 150 U/L    ALT 16 0 - 50 U/L    AST 20 0 - 45 U/L   CBC with platelets and differential   Result Value Ref Range    WBC 5.9 4.0 - 11.0 10e9/L    RBC Count 4.33 3.8 - 5.2 10e12/L    Hemoglobin 13.6 11.7 - 15.7 g/dL    Hematocrit 39.6 35.0 - 47.0 %    MCV 92 78 - 100 fl    MCH 31.4 26.5 - 33.0 pg    MCHC 34.3 31.5 - 36.5 g/dL    RDW 11.4 10.0 - 15.0 %    Platelet Count 233 150 - 450 10e9/L    % Neutrophils 63.1 %    % Lymphocytes 28.5 %    % Monocytes 7.1 %    % Eosinophils 0.8 %    % Basophils 0.5 %    Absolute Neutrophil 3.7 1.6 - 8.3 10e9/L    Absolute Lymphocytes 1.7 0.8 - 5.3 10e9/L    Absolute Monocytes 0.4 0.0 - 1.3 10e9/L    Absolute Eosinophils 0.1 0.0 - 0.7 10e9/L    Absolute Basophils 0.0 0.0 - 0.2 10e9/L    Diff Method  Automated Method    UA with Microscopic reflex to Culture   Result Value Ref Range    Color Urine Yellow     Appearance Urine Cloudy     Glucose Urine Negative NEG^Negative mg/dL    Bilirubin Urine Moderate (A) NEG^Negative    Ketones Urine >=80 (A) NEG^Negative mg/dL    Specific Gravity Urine >1.030 1.003 - 1.035    pH Urine 6.0 5.0 - 7.0 pH    Protein Albumin Urine 30 (A) NEG^Negative mg/dL    Urobilinogen Urine 0.2 0.2 - 1.0 EU/dL    Nitrite Urine Negative NEG^Negative    Blood Urine Trace (A) NEG^Negative    Leukocyte Esterase Urine Negative NEG^Negative    Source Midstream Urine     WBC Urine 0 - 5 OTO5^0 - 5 /HPF    RBC Urine O - 2 OTO2^O - 2 /HPF    Squamous Epithelial /LPF Urine Many (A) FEW^Few /LPF    Bacteria Urine Moderate (A) NEG^Negative /HPF    Mucous Urine Present (A) NEG^Negative /LPF   Lipase   Result Value Ref Range    Lipase 82 73 - 393 U/L   Amylase   Result Value Ref Range    Amylase 31 30 - 110 U/L   Rapid strep screen   Result Value Ref Range    Specimen Description Throat     Rapid Strep A Screen       NEGATIVE: No Group A streptococcal antigen detected by immunoassay, await culture report.       (R11.2) Nausea and vomiting, intractability of vomiting not specified, unspecified vomiting type  (primary encounter diagnosis)  Comment:   Plan: Comprehensive metabolic panel (BMP + Alb, Alk         Phos, ALT, AST, Total. Bili, TP), Lipase,         Amylase, ondansetron (ZOFRAN-ODT) 4 MG ODT tab          Clear liquids, slowly, then advance diet to bland diet and then to normal diet as tolerated    (R07.0) Throat pain  Comment:   Plan: Rapid strep screen, Beta strep group A culture            (R50.9) Fever and chills  Comment:   Plan: CBC with platelets and differential, UA with         Microscopic reflex to Culture          Consistent overall with viral syndrome  Follow up with primary clinic should symptoms persist, to ED should symptoms worsen.      Patient expresses understanding and agreement  with the assessment and plan as above.

## 2019-05-23 LAB
BACTERIA SPEC CULT: NORMAL
SPECIMEN SOURCE: NORMAL

## 2019-10-04 ENCOUNTER — IMMUNIZATION (OUTPATIENT)
Dept: NURSING | Facility: CLINIC | Age: 29
End: 2019-10-04
Payer: COMMERCIAL

## 2019-10-04 PROCEDURE — 90471 IMMUNIZATION ADMIN: CPT

## 2019-10-04 PROCEDURE — 90686 IIV4 VACC NO PRSV 0.5 ML IM: CPT

## 2020-03-10 ENCOUNTER — HEALTH MAINTENANCE LETTER (OUTPATIENT)
Age: 30
End: 2020-03-10

## 2020-12-27 ENCOUNTER — HEALTH MAINTENANCE LETTER (OUTPATIENT)
Age: 30
End: 2020-12-27

## 2021-04-24 ENCOUNTER — HEALTH MAINTENANCE LETTER (OUTPATIENT)
Age: 31
End: 2021-04-24

## 2021-10-09 ENCOUNTER — HEALTH MAINTENANCE LETTER (OUTPATIENT)
Age: 31
End: 2021-10-09

## 2022-05-21 ENCOUNTER — HEALTH MAINTENANCE LETTER (OUTPATIENT)
Age: 32
End: 2022-05-21

## 2022-09-11 ENCOUNTER — HEALTH MAINTENANCE LETTER (OUTPATIENT)
Age: 32
End: 2022-09-11

## 2023-06-03 ENCOUNTER — HEALTH MAINTENANCE LETTER (OUTPATIENT)
Age: 33
End: 2023-06-03

## 2023-10-20 NOTE — PLAN OF CARE
Problem: Patient Care Overview  Goal: Plan of Care/Patient Progress Review  Outcome: Improving  VSS. AFebrile. Up ad reginaldo. Showered. IVSL discontinued. Breast feeding well. Attentive to baby's needs. FOB in room and supportive. C/o some pain but tolerable. Medicated with relief. Will continue to monitor.        O-L Flap Text: The defect edges were debeveled with a #15 scalpel blade.  Given the location of the defect, shape of the defect and the proximity to free margins an O-L flap was deemed most appropriate.  Using a sterile surgical marker, an appropriate advancement flap was drawn incorporating the defect and placing the expected incisions within the relaxed skin tension lines where possible.    The area thus outlined was incised deep to adipose tissue with a #15 scalpel blade.  The skin margins were undermined to an appropriate distance in all directions utilizing iris scissors.